# Patient Record
Sex: FEMALE | URBAN - METROPOLITAN AREA
[De-identification: names, ages, dates, MRNs, and addresses within clinical notes are randomized per-mention and may not be internally consistent; named-entity substitution may affect disease eponyms.]

---

## 2017-03-23 ENCOUNTER — INPATIENT (INPATIENT)
Facility: HOSPITAL | Age: 42
LOS: 0 days | Discharge: HOME | DRG: 390 | End: 2017-03-24
Attending: SURGERY | Admitting: SURGERY
Payer: MEDICAID

## 2017-03-23 VITALS
TEMPERATURE: 97 F | HEART RATE: 94 BPM | RESPIRATION RATE: 20 BRPM | HEIGHT: 58 IN | SYSTOLIC BLOOD PRESSURE: 109 MMHG | DIASTOLIC BLOOD PRESSURE: 74 MMHG | WEIGHT: 111.99 LBS | OXYGEN SATURATION: 100 %

## 2017-03-23 LAB
ALBUMIN SERPL ELPH-MCNC: 4.3 G/DL — SIGNIFICANT CHANGE UP (ref 3.3–5.2)
ALP SERPL-CCNC: 197 U/L — HIGH (ref 40–120)
ALT FLD-CCNC: 61 U/L — HIGH
ANION GAP SERPL CALC-SCNC: 11 MMOL/L — SIGNIFICANT CHANGE UP (ref 5–17)
APPEARANCE UR: CLEAR — SIGNIFICANT CHANGE UP
AST SERPL-CCNC: 41 U/L — HIGH
BACTERIA # UR AUTO: ABNORMAL
BASOPHILS # BLD AUTO: 0 K/UL — SIGNIFICANT CHANGE UP (ref 0–0.2)
BASOPHILS NFR BLD AUTO: 0.2 % — SIGNIFICANT CHANGE UP (ref 0–2)
BILIRUB SERPL-MCNC: 0.4 MG/DL — SIGNIFICANT CHANGE UP (ref 0.4–2)
BILIRUB UR-MCNC: NEGATIVE — SIGNIFICANT CHANGE UP
BUN SERPL-MCNC: 12 MG/DL — SIGNIFICANT CHANGE UP (ref 8–20)
CALCIUM SERPL-MCNC: 9.7 MG/DL — SIGNIFICANT CHANGE UP (ref 8.6–10.2)
CHLORIDE SERPL-SCNC: 103 MMOL/L — SIGNIFICANT CHANGE UP (ref 98–107)
CO2 SERPL-SCNC: 25 MMOL/L — SIGNIFICANT CHANGE UP (ref 22–29)
COD CRY URNS QL: ABNORMAL
COLOR SPEC: YELLOW — SIGNIFICANT CHANGE UP
COMMENT - URINE: SIGNIFICANT CHANGE UP
CREAT SERPL-MCNC: <0.46 MG/DL — LOW (ref 0.5–1.3)
DIFF PNL FLD: ABNORMAL
EOSINOPHIL # BLD AUTO: 0 K/UL — SIGNIFICANT CHANGE UP (ref 0–0.5)
EOSINOPHIL NFR BLD AUTO: 0.8 % — SIGNIFICANT CHANGE UP (ref 0–6)
EPI CELLS # UR: ABNORMAL
GLUCOSE SERPL-MCNC: 170 MG/DL — HIGH (ref 70–115)
GLUCOSE UR QL: NEGATIVE MG/DL — SIGNIFICANT CHANGE UP
HCG SERPL-ACNC: <2 MIU/ML — SIGNIFICANT CHANGE UP
HCG UR QL: NEGATIVE — SIGNIFICANT CHANGE UP
HCT VFR BLD CALC: 37.7 % — SIGNIFICANT CHANGE UP (ref 37–47)
HGB BLD-MCNC: 12 G/DL — SIGNIFICANT CHANGE UP (ref 12–16)
KETONES UR-MCNC: NEGATIVE — SIGNIFICANT CHANGE UP
LEUKOCYTE ESTERASE UR-ACNC: NEGATIVE — SIGNIFICANT CHANGE UP
LIDOCAIN IGE QN: 25 U/L — SIGNIFICANT CHANGE UP (ref 22–51)
LYMPHOCYTES # BLD AUTO: 1.2 K/UL — SIGNIFICANT CHANGE UP (ref 1–4.8)
LYMPHOCYTES # BLD AUTO: 18.9 % — LOW (ref 20–55)
MCHC RBC-ENTMCNC: 26.2 PG — LOW (ref 27–31)
MCHC RBC-ENTMCNC: 31.8 G/DL — LOW (ref 32–36)
MCV RBC AUTO: 82.3 FL — SIGNIFICANT CHANGE UP (ref 81–99)
MONOCYTES # BLD AUTO: 0.2 K/UL — SIGNIFICANT CHANGE UP (ref 0–0.8)
MONOCYTES NFR BLD AUTO: 3.7 % — SIGNIFICANT CHANGE UP (ref 3–10)
NEUTROPHILS # BLD AUTO: 5 K/UL — SIGNIFICANT CHANGE UP (ref 1.8–8)
NEUTROPHILS NFR BLD AUTO: 76.2 % — HIGH (ref 37–73)
NITRITE UR-MCNC: NEGATIVE — SIGNIFICANT CHANGE UP
PH UR: 5 — SIGNIFICANT CHANGE UP (ref 4.8–8)
PLATELET # BLD AUTO: 200 K/UL — SIGNIFICANT CHANGE UP (ref 150–400)
POTASSIUM SERPL-MCNC: 3.8 MMOL/L — SIGNIFICANT CHANGE UP (ref 3.5–5.3)
POTASSIUM SERPL-SCNC: 3.8 MMOL/L — SIGNIFICANT CHANGE UP (ref 3.5–5.3)
PROT SERPL-MCNC: 7.5 G/DL — SIGNIFICANT CHANGE UP (ref 6.6–8.7)
PROT UR-MCNC: NEGATIVE MG/DL — SIGNIFICANT CHANGE UP
RBC # BLD: 4.58 M/UL — SIGNIFICANT CHANGE UP (ref 4.4–5.2)
RBC # FLD: 15.2 % — SIGNIFICANT CHANGE UP (ref 11–15.6)
RBC CASTS # UR COMP ASSIST: ABNORMAL /HPF (ref 0–4)
SODIUM SERPL-SCNC: 139 MMOL/L — SIGNIFICANT CHANGE UP (ref 135–145)
SP GR SPEC: 1.02 — SIGNIFICANT CHANGE UP (ref 1.01–1.02)
UROBILINOGEN FLD QL: NEGATIVE MG/DL — SIGNIFICANT CHANGE UP
WBC # BLD: 6.6 K/UL — SIGNIFICANT CHANGE UP (ref 4.8–10.8)
WBC # FLD AUTO: 6.6 K/UL — SIGNIFICANT CHANGE UP (ref 4.8–10.8)
WBC UR QL: SIGNIFICANT CHANGE UP

## 2017-03-23 PROCEDURE — 99285 EMERGENCY DEPT VISIT HI MDM: CPT

## 2017-03-23 RX ORDER — SODIUM CHLORIDE 9 MG/ML
1000 INJECTION INTRAMUSCULAR; INTRAVENOUS; SUBCUTANEOUS
Qty: 0 | Refills: 0 | Status: DISCONTINUED | OUTPATIENT
Start: 2017-03-23 | End: 2017-03-24

## 2017-03-23 RX ORDER — PANTOPRAZOLE SODIUM 20 MG/1
40 TABLET, DELAYED RELEASE ORAL ONCE
Qty: 0 | Refills: 0 | Status: COMPLETED | OUTPATIENT
Start: 2017-03-23 | End: 2017-03-23

## 2017-03-23 RX ORDER — SODIUM CHLORIDE 9 MG/ML
1000 INJECTION INTRAMUSCULAR; INTRAVENOUS; SUBCUTANEOUS ONCE
Qty: 0 | Refills: 0 | Status: COMPLETED | OUTPATIENT
Start: 2017-03-23 | End: 2017-03-23

## 2017-03-23 RX ORDER — METOCLOPRAMIDE HCL 10 MG
10 TABLET ORAL ONCE
Qty: 0 | Refills: 0 | Status: COMPLETED | OUTPATIENT
Start: 2017-03-23 | End: 2017-03-23

## 2017-03-23 RX ORDER — KETOROLAC TROMETHAMINE 30 MG/ML
30 SYRINGE (ML) INJECTION ONCE
Qty: 0 | Refills: 0 | Status: DISCONTINUED | OUTPATIENT
Start: 2017-03-23 | End: 2017-03-23

## 2017-03-23 RX ADMIN — SODIUM CHLORIDE 1000 MILLILITER(S): 9 INJECTION INTRAMUSCULAR; INTRAVENOUS; SUBCUTANEOUS at 23:05

## 2017-03-23 RX ADMIN — PANTOPRAZOLE SODIUM 40 MILLIGRAM(S): 20 TABLET, DELAYED RELEASE ORAL at 23:05

## 2017-03-23 RX ADMIN — Medication 10 MILLIGRAM(S): at 23:05

## 2017-03-23 NOTE — ED STATDOCS - MEDICAL DECISION MAKING DETAILS
Sent by PMD for abd pain. Perimumbiclia/distention w/ n/v. H/o gastric bypass. Concern for obstruction. Line, labs, lipase for pancreatitis. Urine for UA/preg. Check results and re-assess.

## 2017-03-23 NOTE — ED STATDOCS - GASTROINTESTINAL, MLM
Abdomen distended and diffusely tender to deep palpation. No rebound or guarding. Bowel sounds present.

## 2017-03-23 NOTE — ED STATDOCS - NS ED MD SCRIBE ATTENDING SCRIBE SECTIONS
PHYSICAL EXAM/DISPOSITION/VITAL SIGNS( Pullset)/REVIEW OF SYSTEMS/HISTORY OF PRESENT ILLNESS/PAST MEDICAL/SURGICAL/SOCIAL HISTORY/HIV

## 2017-03-23 NOTE — ED ADULT NURSE NOTE - OBJECTIVE STATEMENT
pt received in super[facility]. pt is awake alert oriented following commands and speaking coherently. pt presents to er complaining of umbilical abdominal pain since last night. acute onset. abdomen soft and tender to touch. resp even and unlabored. lung sounds cta bl. complaining of associated nausea and vomiting. denies fever chills chest pain sob headache urinary problems diarrhea constipation. ambulatory with steady gait

## 2017-03-23 NOTE — ED STATDOCS - OBJECTIVE STATEMENT
42 y/o female c/o umbilical abd pain x 1 day. Acute onset. Associated nausea and 3 episodes vomiting today. Pain is exacerbated w/ eating. Denies diarrhea, dysuria. Denies h/o similar pain. PSHx gastric bypass 07/2015 and "?abdominal mass" removed. Pt saw Dr. Forte PCP - recommended go to [place] ED due to hernia. No further complaints at this time.

## 2017-03-23 NOTE — ED STATDOCS - LAB INTERPRETATION
no white count but left shift  urine = rbcs with crystals and bacteria  elevated alt/ast and alk phos  elevated bs

## 2017-03-23 NOTE — ED STATDOCS - PROGRESS NOTE DETAILS
Pt CT done and Radiologist called and stated that Pt has + Bowel obstruction. Pt made NPO and was treated with 100mg IV Acetaminophen ans Pt feels al ot better.

## 2017-03-23 NOTE — ED STATDOCS - ATTENDING CONTRIBUTION TO CARE
I, Jonathan Cardona, performed the initial face to face bedside interview with this patient regarding history of present illness, review of symptoms and relevant past medical, social and family history.  I completed an independent physical examination.  I was the initial provider who evaluated this patient. I have signed out the follow up of any pending tests (i.e. labs, radiological studies) to the ACP.  I have communicated the patient’s plan of care and disposition with the ACP.  The history, relevant review of systems, past medical and surgical history, medical decision making, and physical examination was documented by the scribe in my presence and I attest to the accuracy of the documentation.

## 2017-03-24 VITALS
HEART RATE: 61 BPM | TEMPERATURE: 98 F | SYSTOLIC BLOOD PRESSURE: 101 MMHG | OXYGEN SATURATION: 100 % | RESPIRATION RATE: 18 BRPM | DIASTOLIC BLOOD PRESSURE: 66 MMHG

## 2017-03-24 DIAGNOSIS — Z98.890 OTHER SPECIFIED POSTPROCEDURAL STATES: Chronic | ICD-10-CM

## 2017-03-24 DIAGNOSIS — K56.60 UNSPECIFIED INTESTINAL OBSTRUCTION: ICD-10-CM

## 2017-03-24 DIAGNOSIS — E66.9 OBESITY, UNSPECIFIED: ICD-10-CM

## 2017-03-24 DIAGNOSIS — K56.5 INTESTINAL ADHESIONS [BANDS] WITH OBSTRUCTION (POSTINFECTION): ICD-10-CM

## 2017-03-24 PROCEDURE — 71010: CPT | Mod: 26

## 2017-03-24 PROCEDURE — 74177 CT ABD & PELVIS W/CONTRAST: CPT | Mod: 26

## 2017-03-24 RX ORDER — ACETAMINOPHEN 500 MG
1000 TABLET ORAL ONCE
Qty: 0 | Refills: 0 | Status: COMPLETED | OUTPATIENT
Start: 2017-03-24 | End: 2017-03-24

## 2017-03-24 RX ORDER — SODIUM CHLORIDE 9 MG/ML
1000 INJECTION, SOLUTION INTRAVENOUS
Qty: 0 | Refills: 0 | Status: DISCONTINUED | OUTPATIENT
Start: 2017-03-24 | End: 2017-03-24

## 2017-03-24 RX ORDER — ENOXAPARIN SODIUM 100 MG/ML
40 INJECTION SUBCUTANEOUS EVERY 24 HOURS
Qty: 0 | Refills: 0 | Status: DISCONTINUED | OUTPATIENT
Start: 2017-03-24 | End: 2017-03-24

## 2017-03-24 RX ADMIN — SODIUM CHLORIDE 150 MILLILITER(S): 9 INJECTION INTRAMUSCULAR; INTRAVENOUS; SUBCUTANEOUS at 09:36

## 2017-03-24 RX ADMIN — Medication 1000 MILLIGRAM(S): at 13:12

## 2017-03-24 RX ADMIN — Medication 400 MILLIGRAM(S): at 10:44

## 2017-03-24 RX ADMIN — SODIUM CHLORIDE 100 MILLILITER(S): 9 INJECTION, SOLUTION INTRAVENOUS at 06:04

## 2017-03-24 NOTE — DISCHARGE NOTE ADULT - HOSPITAL COURSE
A 42 yo F presents to the ED c/o abdominal pain x 3 days. Pain constant, localized in mid abdomen, associated with chills, nausea and vomiting. Last bowel movement was yesterday, no flatus. Has never experienced similar pain in the past. Denies CP, SOB or diaphoresis. No change in diet or sick contacts.   CT A/P revealed obstruction at darrel limb, trace amount enteric contrast seen distally beyong the jejunostomy.  Pt did not require NGT on this admission.  Pt diet advanced hospital day 1, tolerated.  Abd soft, no further nausea or vomting.  Pt stable for d/c home with recommended outpatient follow up with bariatric surgeon.

## 2017-03-24 NOTE — DISCHARGE NOTE ADULT - CARE PLAN
Principal Discharge DX:	Partial bowel obstruction  Goal:	resolution of obstruction  Instructions for follow-up, activity and diet:	Pt may resume regular diet as tolerated, showering permitted.  Recommended to follow up with Bariatric Surgeon as an outpatient.  Return to the ED immediately for inability to have BM, severe abd pain, nausea, vomiting, high fever.

## 2017-03-24 NOTE — PROGRESS NOTE ADULT - SUBJECTIVE AND OBJECTIVE BOX
Feels ok no pain today  afebrile  Abd benign   Will try feeds   Irf fails will need will need revision if ok can be dced andf advised to follow up with bariatric surgeon

## 2017-03-24 NOTE — DISCHARGE NOTE ADULT - PLAN OF CARE
resolution of obstruction Pt may resume regular diet as tolerated, showering permitted.  Recommended to follow up with Bariatric Surgeon as an outpatient.  Return to the ED immediately for inability to have BM, severe abd pain, nausea, vomiting, high fever.

## 2017-03-24 NOTE — H&P ADULT - HISTORY OF PRESENT ILLNESS
A 42 yo F presents to the ED c/o abdominal pain x 3 days. Pain constant, localized in mid abdomen, associated with chills, nausea and vomiting. Last bowel movement was yesterday, no flatus. Has never experienced similar pain in the past. Denies CP, SOB or diaphoresis. No change in diet or sick contacts.

## 2017-03-24 NOTE — H&P ADULT - ASSESSMENT
A 40 yo F presents w abdominal pain. Midline abdominal pain is constant a/w chills, nausea, and vomiting. On PE she's afebrile, normotensive. Distended abdomen w umbilical port site hernia which is reducible and tender. No elevated wbc count, does have a shift. CT A/P confirms diagnosis of SBO of [place] limb.  / NPO, IV fluids  / NGT placement was discussed patient refusing although highly recommended for decompression    / serial abdominal checks   / strict I/OS  / analgesics as needed   / VTE ppx   / replete lytes prn   / d/w Dr. Melvin

## 2017-03-24 NOTE — DISCHARGE NOTE ADULT - CARE PROVIDER_API CALL
Rogers Melvin (MD), Surgery  [address]  [place] [address]  Phone: [phone]  Fax: [phone]

## 2017-03-24 NOTE — DISCHARGE NOTE ADULT - PATIENT PORTAL LINK FT
“You can access the [facility] Patient Portal, offered by [facility], by registering with the following website: [email or web address]”

## 2017-12-15 PROCEDURE — 74177 CT ABD & PELVIS W/CONTRAST: CPT

## 2017-12-15 PROCEDURE — 84702 CHORIONIC GONADOTROPIN TEST: CPT

## 2017-12-15 PROCEDURE — 80053 COMPREHEN METABOLIC PANEL: CPT

## 2017-12-15 PROCEDURE — 99285 EMERGENCY DEPT VISIT HI MDM: CPT | Mod: 25

## 2017-12-15 PROCEDURE — 96374 THER/PROPH/DIAG INJ IV PUSH: CPT | Mod: XU

## 2017-12-15 PROCEDURE — 81025 URINE PREGNANCY TEST: CPT

## 2017-12-15 PROCEDURE — 85027 COMPLETE CBC AUTOMATED: CPT

## 2017-12-15 PROCEDURE — 71045 X-RAY EXAM CHEST 1 VIEW: CPT

## 2017-12-15 PROCEDURE — 83690 ASSAY OF LIPASE: CPT

## 2017-12-15 PROCEDURE — 96375 TX/PRO/DX INJ NEW DRUG ADDON: CPT | Mod: XU

## 2017-12-15 PROCEDURE — 81001 URINALYSIS AUTO W/SCOPE: CPT

## 2019-08-20 ENCOUNTER — EMERGENCY (EMERGENCY)
Facility: HOSPITAL | Age: 44
LOS: 1 days | Discharge: HOME | End: 2019-08-20
Attending: EMERGENCY MEDICINE
Payer: MEDICAID

## 2019-08-20 VITALS
HEART RATE: 60 BPM | DIASTOLIC BLOOD PRESSURE: 86 MMHG | OXYGEN SATURATION: 98 % | SYSTOLIC BLOOD PRESSURE: 116 MMHG | TEMPERATURE: 98 F | RESPIRATION RATE: 17 BRPM

## 2019-08-20 VITALS
HEART RATE: 82 BPM | DIASTOLIC BLOOD PRESSURE: 72 MMHG | SYSTOLIC BLOOD PRESSURE: 119 MMHG | HEIGHT: 58 IN | RESPIRATION RATE: 18 BRPM | OXYGEN SATURATION: 100 % | WEIGHT: 123.02 LBS | TEMPERATURE: 99 F

## 2019-08-20 DIAGNOSIS — Z98.890 OTHER SPECIFIED POSTPROCEDURAL STATES: Chronic | ICD-10-CM

## 2019-08-20 LAB
ALBUMIN SERPL ELPH-MCNC: 4.1 G/DL — SIGNIFICANT CHANGE UP (ref 3.3–5.2)
ALP SERPL-CCNC: 135 U/L — HIGH (ref 40–120)
ALT FLD-CCNC: 32 U/L — SIGNIFICANT CHANGE UP
ANION GAP SERPL CALC-SCNC: 9 MMOL/L — SIGNIFICANT CHANGE UP (ref 5–17)
APPEARANCE UR: CLEAR — SIGNIFICANT CHANGE UP
AST SERPL-CCNC: 39 U/L — HIGH
BASOPHILS # BLD AUTO: 0.03 K/UL — SIGNIFICANT CHANGE UP (ref 0–0.2)
BASOPHILS NFR BLD AUTO: 0.6 % — SIGNIFICANT CHANGE UP (ref 0–2)
BILIRUB SERPL-MCNC: 0.4 MG/DL — SIGNIFICANT CHANGE UP (ref 0.4–2)
BILIRUB UR-MCNC: NEGATIVE — SIGNIFICANT CHANGE UP
BUN SERPL-MCNC: 9 MG/DL — SIGNIFICANT CHANGE UP (ref 8–20)
CALCIUM SERPL-MCNC: 9.7 MG/DL — SIGNIFICANT CHANGE UP (ref 8.6–10.2)
CHLORIDE SERPL-SCNC: 106 MMOL/L — SIGNIFICANT CHANGE UP (ref 98–107)
CO2 SERPL-SCNC: 26 MMOL/L — SIGNIFICANT CHANGE UP (ref 22–29)
COLOR SPEC: YELLOW — SIGNIFICANT CHANGE UP
CREAT SERPL-MCNC: 0.31 MG/DL — LOW (ref 0.5–1.3)
DIFF PNL FLD: NEGATIVE — SIGNIFICANT CHANGE UP
EOSINOPHIL # BLD AUTO: 0.07 K/UL — SIGNIFICANT CHANGE UP (ref 0–0.5)
EOSINOPHIL NFR BLD AUTO: 1.4 % — SIGNIFICANT CHANGE UP (ref 0–6)
GLUCOSE SERPL-MCNC: 122 MG/DL — HIGH (ref 70–115)
GLUCOSE UR QL: NEGATIVE MG/DL — SIGNIFICANT CHANGE UP
HCG UR QL: NEGATIVE — SIGNIFICANT CHANGE UP
HCT VFR BLD CALC: 40.1 % — SIGNIFICANT CHANGE UP (ref 34.5–45)
HGB BLD-MCNC: 12.6 G/DL — SIGNIFICANT CHANGE UP (ref 11.5–15.5)
IMM GRANULOCYTES NFR BLD AUTO: 0.2 % — SIGNIFICANT CHANGE UP (ref 0–1.5)
KETONES UR-MCNC: NEGATIVE — SIGNIFICANT CHANGE UP
LEUKOCYTE ESTERASE UR-ACNC: NEGATIVE — SIGNIFICANT CHANGE UP
LIDOCAIN IGE QN: 21 U/L — LOW (ref 22–51)
LYMPHOCYTES # BLD AUTO: 1.85 K/UL — SIGNIFICANT CHANGE UP (ref 1–3.3)
LYMPHOCYTES # BLD AUTO: 36.2 % — SIGNIFICANT CHANGE UP (ref 13–44)
MCHC RBC-ENTMCNC: 29.2 PG — SIGNIFICANT CHANGE UP (ref 27–34)
MCHC RBC-ENTMCNC: 31.4 GM/DL — LOW (ref 32–36)
MCV RBC AUTO: 92.8 FL — SIGNIFICANT CHANGE UP (ref 80–100)
MONOCYTES # BLD AUTO: 0.3 K/UL — SIGNIFICANT CHANGE UP (ref 0–0.9)
MONOCYTES NFR BLD AUTO: 5.9 % — SIGNIFICANT CHANGE UP (ref 2–14)
NEUTROPHILS # BLD AUTO: 2.85 K/UL — SIGNIFICANT CHANGE UP (ref 1.8–7.4)
NEUTROPHILS NFR BLD AUTO: 55.7 % — SIGNIFICANT CHANGE UP (ref 43–77)
NITRITE UR-MCNC: NEGATIVE — SIGNIFICANT CHANGE UP
PH UR: 6 — SIGNIFICANT CHANGE UP (ref 5–8)
PLATELET # BLD AUTO: 160 K/UL — SIGNIFICANT CHANGE UP (ref 150–400)
POTASSIUM SERPL-MCNC: 4.9 MMOL/L — SIGNIFICANT CHANGE UP (ref 3.5–5.3)
POTASSIUM SERPL-SCNC: 4.9 MMOL/L — SIGNIFICANT CHANGE UP (ref 3.5–5.3)
PROT SERPL-MCNC: 7 G/DL — SIGNIFICANT CHANGE UP (ref 6.6–8.7)
PROT UR-MCNC: NEGATIVE MG/DL — SIGNIFICANT CHANGE UP
RBC # BLD: 4.32 M/UL — SIGNIFICANT CHANGE UP (ref 3.8–5.2)
RBC # FLD: 12.9 % — SIGNIFICANT CHANGE UP (ref 10.3–14.5)
SODIUM SERPL-SCNC: 141 MMOL/L — SIGNIFICANT CHANGE UP (ref 135–145)
SP GR SPEC: 1.01 — SIGNIFICANT CHANGE UP (ref 1.01–1.02)
UROBILINOGEN FLD QL: NEGATIVE MG/DL — SIGNIFICANT CHANGE UP
WBC # BLD: 5.11 K/UL — SIGNIFICANT CHANGE UP (ref 3.8–10.5)
WBC # FLD AUTO: 5.11 K/UL — SIGNIFICANT CHANGE UP (ref 3.8–10.5)

## 2019-08-20 PROCEDURE — 81003 URINALYSIS AUTO W/O SCOPE: CPT

## 2019-08-20 PROCEDURE — 81025 URINE PREGNANCY TEST: CPT

## 2019-08-20 PROCEDURE — 87086 URINE CULTURE/COLONY COUNT: CPT

## 2019-08-20 PROCEDURE — 85027 COMPLETE CBC AUTOMATED: CPT

## 2019-08-20 PROCEDURE — 74177 CT ABD & PELVIS W/CONTRAST: CPT | Mod: 26

## 2019-08-20 PROCEDURE — 96360 HYDRATION IV INFUSION INIT: CPT

## 2019-08-20 PROCEDURE — 74177 CT ABD & PELVIS W/CONTRAST: CPT

## 2019-08-20 PROCEDURE — 99284 EMERGENCY DEPT VISIT MOD MDM: CPT | Mod: 25

## 2019-08-20 PROCEDURE — 36415 COLL VENOUS BLD VENIPUNCTURE: CPT

## 2019-08-20 PROCEDURE — 99284 EMERGENCY DEPT VISIT MOD MDM: CPT

## 2019-08-20 PROCEDURE — 80053 COMPREHEN METABOLIC PANEL: CPT

## 2019-08-20 PROCEDURE — 83690 ASSAY OF LIPASE: CPT

## 2019-08-20 RX ORDER — SODIUM CHLORIDE 9 MG/ML
1000 INJECTION INTRAMUSCULAR; INTRAVENOUS; SUBCUTANEOUS ONCE
Refills: 0 | Status: COMPLETED | OUTPATIENT
Start: 2019-08-20 | End: 2019-08-20

## 2019-08-20 RX ADMIN — SODIUM CHLORIDE 1000 MILLILITER(S): 9 INJECTION INTRAMUSCULAR; INTRAVENOUS; SUBCUTANEOUS at 18:12

## 2019-08-20 RX ADMIN — SODIUM CHLORIDE 1000 MILLILITER(S): 9 INJECTION INTRAMUSCULAR; INTRAVENOUS; SUBCUTANEOUS at 19:18

## 2019-08-20 NOTE — ED STATDOCS - OBJECTIVE STATEMENT
Pt ui s a 42 y/o F, s/p gastric bypass three years ago, presenting to the ED with c/o abdominal pain. Pt states the pain is located in the left lower and lateral abd, with radiating to her left flank. Pain has been constant for 3 days and gradually worsening. She notes the pain is worse with movement and improved with applying some pressure. Associated nausea. Denies V/D, fever, urinary sxs. Pt states she has had this pain in the past which was attributed to a kidney infection./

## 2019-08-20 NOTE — ED STATDOCS - PHYSICAL EXAMINATION
Gen: NAD, AOx3  Head: NCAT  HEENT: EOMI, oral mucosa moist, normal conjunctiva, neck supple  Lung: no respiratory distress  CV:  Normal perfusion  Abd: soft, diffuse upper abd tenderness, non distended   MSK: No edema, no visible deformities  Neuro: No focal neurologic deficits  Skin: No rash   Psych: normal affect Gen: NAD, AOx3  Head: NCAT  HEENT: EOMI, oral mucosa moist, normal conjunctiva, neck supple  Lung: no respiratory distress  CV:  Normal perfusion  Abd: soft, diffuse upper abd tenderness, non distended, no CVA ttp  MSK: No edema, no visible deformities  Neuro: No focal neurologic deficits  Skin: No rash   Psych: normal affect

## 2019-08-20 NOTE — ED STATDOCS - NS ED ROS FT
ROS: no CP/SOB. no cough. no fever. no v/d/c. no rash. no bleeding. no urinary complaints. no weakness. no vision changes. no HA. no neck/back pain. no extremity swelling/deformity. No change in mental status.     +abd pain, nausea

## 2019-08-20 NOTE — ED STATDOCS - PROGRESS NOTE DETAILS
HOLLY Huber NOTE: Pt evaluated at bedside. Pt reporting left lower and lateral abdominal pain that started 3 days ago, gradually worsened, denies urinary sxs. Had normal BM today. No fever, vomiting or diarrhea. Abd soft with upper abdominal TTP, left flank TTP. Pt evaluated prior by intake physician. Otherwise HPI/PE/ROS as noted above. Will follow up plan per intake physician. CT negative. will d/c with outpt Follow up -Slowashok DO

## 2019-08-20 NOTE — ED ADULT NURSE NOTE - NSIMPLEMENTINTERV_GEN_ALL_ED
Implemented All Universal Safety Interventions:  [place] to call system. Call bell, personal items and telephone within reach. Instruct patient to call for assistance. Room bathroom lighting operational. Non-slip footwear when patient is off stretcher. Physically safe environment: no spills, clutter or unnecessary equipment. Stretcher in lowest position, wheels locked, appropriate side rails in place.

## 2019-08-20 NOTE — ED STATDOCS - CARE PLAN
Principal Discharge DX:	Generalized abdominal pain Principal Discharge DX:	Generalized abdominal pain  Assessment and plan of treatment:	1. Return to ED for worsening, progressive or any other concerning symptoms   2. Follow up with your primary care doctor in 2-3days   3. Take motrin 600mg every 6 hours as needed for pain and Take Tylenol up to 650 mg every 6 hours as needed for pain.

## 2019-08-20 NOTE — ED STATDOCS - ATTENDING CONTRIBUTION TO CARE
I, Kenya Nava, performed the initial face to face bedside interview with this patient regarding history of present illness, review of symptoms and relevant past medical, social and family history.  I completed an independent physical examination.   The medical decision making and follow-up on ordered tests (ie labs, radiologic studies) and re-evaluation of the patient's status has been communicated to the ACP.  Disposition of the patient will be based on test outcome and response to ED interventions.  The history, relevant review of systems, past medical and surgical history, medical decision making, and physical examination was documented by the scribe in my presence and I attest to the accuracy of the documentation.

## 2019-08-21 PROBLEM — E66.9 OBESITY, UNSPECIFIED: Chronic | Status: ACTIVE | Noted: 2017-03-24

## 2019-08-21 LAB
CULTURE RESULTS: SIGNIFICANT CHANGE UP
SPECIMEN SOURCE: SIGNIFICANT CHANGE UP

## 2020-02-10 ENCOUNTER — TRANSCRIPTION ENCOUNTER (OUTPATIENT)
Age: 45
End: 2020-02-10

## 2020-02-11 ENCOUNTER — INPATIENT (INPATIENT)
Facility: HOSPITAL | Age: 45
LOS: 2 days | Discharge: HOME | DRG: 326 | End: 2020-02-14
Attending: SURGERY | Admitting: SURGERY
Payer: MEDICAID

## 2020-02-11 VITALS
SYSTOLIC BLOOD PRESSURE: 97 MMHG | WEIGHT: 121.92 LBS | HEART RATE: 75 BPM | OXYGEN SATURATION: 99 % | RESPIRATION RATE: 18 BRPM | TEMPERATURE: 99 F | DIASTOLIC BLOOD PRESSURE: 67 MMHG

## 2020-02-11 DIAGNOSIS — K56.609 UNSPECIFIED INTESTINAL OBSTRUCTION, UNSPECIFIED AS TO PARTIAL VERSUS COMPLETE OBSTRUCTION: ICD-10-CM

## 2020-02-11 DIAGNOSIS — Z98.890 OTHER SPECIFIED POSTPROCEDURAL STATES: Chronic | ICD-10-CM

## 2020-02-11 PROBLEM — N15.9 RENAL TUBULO-INTERSTITIAL DISEASE, UNSPECIFIED: Chronic | Status: ACTIVE | Noted: 2019-08-20

## 2020-02-11 LAB
ABO RH CONFIRMATION: SIGNIFICANT CHANGE UP
ALBUMIN SERPL ELPH-MCNC: 4.6 G/DL — SIGNIFICANT CHANGE UP (ref 3.3–5.2)
ALP SERPL-CCNC: 158 U/L — HIGH (ref 40–120)
ALT FLD-CCNC: 45 U/L — HIGH
ANION GAP SERPL CALC-SCNC: 14 MMOL/L — SIGNIFICANT CHANGE UP (ref 5–17)
ANION GAP SERPL CALC-SCNC: 14 MMOL/L — SIGNIFICANT CHANGE UP (ref 5–17)
AST SERPL-CCNC: 51 U/L — HIGH
BASOPHILS # BLD AUTO: 0.04 K/UL — SIGNIFICANT CHANGE UP (ref 0–0.2)
BASOPHILS NFR BLD AUTO: 0.4 % — SIGNIFICANT CHANGE UP (ref 0–2)
BILIRUB SERPL-MCNC: 0.3 MG/DL — LOW (ref 0.4–2)
BLD GP AB SCN SERPL QL: SIGNIFICANT CHANGE UP
BUN SERPL-MCNC: 10 MG/DL — SIGNIFICANT CHANGE UP (ref 8–20)
BUN SERPL-MCNC: 17 MG/DL — SIGNIFICANT CHANGE UP (ref 8–20)
CALCIUM SERPL-MCNC: 7.6 MG/DL — LOW (ref 8.6–10.2)
CALCIUM SERPL-MCNC: 9.8 MG/DL — SIGNIFICANT CHANGE UP (ref 8.6–10.2)
CHLORIDE SERPL-SCNC: 102 MMOL/L — SIGNIFICANT CHANGE UP (ref 98–107)
CHLORIDE SERPL-SCNC: 103 MMOL/L — SIGNIFICANT CHANGE UP (ref 98–107)
CO2 SERPL-SCNC: 19 MMOL/L — LOW (ref 22–29)
CO2 SERPL-SCNC: 22 MMOL/L — SIGNIFICANT CHANGE UP (ref 22–29)
CREAT SERPL-MCNC: 0.31 MG/DL — LOW (ref 0.5–1.3)
CREAT SERPL-MCNC: 0.41 MG/DL — LOW (ref 0.5–1.3)
EOSINOPHIL # BLD AUTO: 0.02 K/UL — SIGNIFICANT CHANGE UP (ref 0–0.5)
EOSINOPHIL NFR BLD AUTO: 0.2 % — SIGNIFICANT CHANGE UP (ref 0–6)
GAS PNL BLDA: SIGNIFICANT CHANGE UP
GLUCOSE SERPL-MCNC: 160 MG/DL — HIGH (ref 70–99)
GLUCOSE SERPL-MCNC: 193 MG/DL — HIGH (ref 70–99)
HCG SERPL-ACNC: <4 MIU/ML — SIGNIFICANT CHANGE UP
HCT VFR BLD CALC: 38.5 % — SIGNIFICANT CHANGE UP (ref 34.5–45)
HCT VFR BLD CALC: 47.1 % — HIGH (ref 34.5–45)
HGB BLD-MCNC: 12.8 G/DL — SIGNIFICANT CHANGE UP (ref 11.5–15.5)
HGB BLD-MCNC: 15.5 G/DL — SIGNIFICANT CHANGE UP (ref 11.5–15.5)
IMM GRANULOCYTES NFR BLD AUTO: 0.4 % — SIGNIFICANT CHANGE UP (ref 0–1.5)
LACTATE BLDV-MCNC: 1.7 MMOL/L — SIGNIFICANT CHANGE UP (ref 0.5–2)
LIDOCAIN IGE QN: 40 U/L — SIGNIFICANT CHANGE UP (ref 22–51)
LYMPHOCYTES # BLD AUTO: 1.01 K/UL — SIGNIFICANT CHANGE UP (ref 1–3.3)
LYMPHOCYTES # BLD AUTO: 8.9 % — LOW (ref 13–44)
MAGNESIUM SERPL-MCNC: 1.6 MG/DL — SIGNIFICANT CHANGE UP (ref 1.6–2.6)
MCHC RBC-ENTMCNC: 30 PG — SIGNIFICANT CHANGE UP (ref 27–34)
MCHC RBC-ENTMCNC: 30.3 PG — SIGNIFICANT CHANGE UP (ref 27–34)
MCHC RBC-ENTMCNC: 32.9 GM/DL — SIGNIFICANT CHANGE UP (ref 32–36)
MCHC RBC-ENTMCNC: 33.2 GM/DL — SIGNIFICANT CHANGE UP (ref 32–36)
MCV RBC AUTO: 91 FL — SIGNIFICANT CHANGE UP (ref 80–100)
MCV RBC AUTO: 91.1 FL — SIGNIFICANT CHANGE UP (ref 80–100)
MONOCYTES # BLD AUTO: 0.52 K/UL — SIGNIFICANT CHANGE UP (ref 0–0.9)
MONOCYTES NFR BLD AUTO: 4.6 % — SIGNIFICANT CHANGE UP (ref 2–14)
NEUTROPHILS # BLD AUTO: 9.68 K/UL — HIGH (ref 1.8–7.4)
NEUTROPHILS NFR BLD AUTO: 85.5 % — HIGH (ref 43–77)
PHOSPHATE SERPL-MCNC: 2.3 MG/DL — LOW (ref 2.4–4.7)
PLATELET # BLD AUTO: 123 K/UL — LOW (ref 150–400)
PLATELET # BLD AUTO: 180 K/UL — SIGNIFICANT CHANGE UP (ref 150–400)
POTASSIUM SERPL-MCNC: 3.9 MMOL/L — SIGNIFICANT CHANGE UP (ref 3.5–5.3)
POTASSIUM SERPL-MCNC: 5.2 MMOL/L — SIGNIFICANT CHANGE UP (ref 3.5–5.3)
POTASSIUM SERPL-SCNC: 3.9 MMOL/L — SIGNIFICANT CHANGE UP (ref 3.5–5.3)
POTASSIUM SERPL-SCNC: 5.2 MMOL/L — SIGNIFICANT CHANGE UP (ref 3.5–5.3)
PROT SERPL-MCNC: 7.8 G/DL — SIGNIFICANT CHANGE UP (ref 6.6–8.7)
RBC # BLD: 4.23 M/UL — SIGNIFICANT CHANGE UP (ref 3.8–5.2)
RBC # BLD: 5.17 M/UL — SIGNIFICANT CHANGE UP (ref 3.8–5.2)
RBC # FLD: 13.1 % — SIGNIFICANT CHANGE UP (ref 10.3–14.5)
RBC # FLD: 13.4 % — SIGNIFICANT CHANGE UP (ref 10.3–14.5)
SODIUM SERPL-SCNC: 136 MMOL/L — SIGNIFICANT CHANGE UP (ref 135–145)
SODIUM SERPL-SCNC: 138 MMOL/L — SIGNIFICANT CHANGE UP (ref 135–145)
WBC # BLD: 11.31 K/UL — HIGH (ref 3.8–10.5)
WBC # BLD: 2.93 K/UL — LOW (ref 3.8–10.5)
WBC # FLD AUTO: 11.31 K/UL — HIGH (ref 3.8–10.5)
WBC # FLD AUTO: 2.93 K/UL — LOW (ref 3.8–10.5)

## 2020-02-11 PROCEDURE — 44180 LAP ENTEROLYSIS: CPT | Mod: 80

## 2020-02-11 PROCEDURE — 99285 EMERGENCY DEPT VISIT HI MDM: CPT

## 2020-02-11 PROCEDURE — 44180 LAP ENTEROLYSIS: CPT

## 2020-02-11 PROCEDURE — 74019 RADEX ABDOMEN 2 VIEWS: CPT | Mod: 26

## 2020-02-11 PROCEDURE — 99291 CRITICAL CARE FIRST HOUR: CPT

## 2020-02-11 PROCEDURE — 74177 CT ABD & PELVIS W/CONTRAST: CPT | Mod: 26

## 2020-02-11 PROCEDURE — 93010 ELECTROCARDIOGRAM REPORT: CPT

## 2020-02-11 PROCEDURE — 99222 1ST HOSP IP/OBS MODERATE 55: CPT | Mod: 57

## 2020-02-11 RX ORDER — SODIUM CHLORIDE 9 MG/ML
1000 INJECTION, SOLUTION INTRAVENOUS
Refills: 0 | Status: DISCONTINUED | OUTPATIENT
Start: 2020-02-11 | End: 2020-02-11

## 2020-02-11 RX ORDER — PROPOFOL 10 MG/ML
20 INJECTION, EMULSION INTRAVENOUS
Qty: 1000 | Refills: 0 | Status: DISCONTINUED | OUTPATIENT
Start: 2020-02-11 | End: 2020-02-12

## 2020-02-11 RX ORDER — FENTANYL CITRATE 50 UG/ML
0.5 INJECTION INTRAVENOUS
Qty: 2500 | Refills: 0 | Status: DISCONTINUED | OUTPATIENT
Start: 2020-02-11 | End: 2020-02-12

## 2020-02-11 RX ORDER — ENOXAPARIN SODIUM 100 MG/ML
40 INJECTION SUBCUTANEOUS ONCE
Refills: 0 | Status: DISCONTINUED | OUTPATIENT
Start: 2020-02-11 | End: 2020-02-11

## 2020-02-11 RX ORDER — ONDANSETRON 8 MG/1
4 TABLET, FILM COATED ORAL ONCE
Refills: 0 | Status: COMPLETED | OUTPATIENT
Start: 2020-02-11 | End: 2020-02-11

## 2020-02-11 RX ORDER — ACETAMINOPHEN 500 MG
650 TABLET ORAL EVERY 6 HOURS
Refills: 0 | Status: DISCONTINUED | OUTPATIENT
Start: 2020-02-11 | End: 2020-02-12

## 2020-02-11 RX ORDER — CEFAZOLIN SODIUM 1 G
1000 VIAL (EA) INJECTION ONCE
Refills: 0 | Status: DISCONTINUED | OUTPATIENT
Start: 2020-02-11 | End: 2020-02-11

## 2020-02-11 RX ORDER — HYDROMORPHONE HYDROCHLORIDE 2 MG/ML
0.5 INJECTION INTRAMUSCULAR; INTRAVENOUS; SUBCUTANEOUS ONCE
Refills: 0 | Status: DISCONTINUED | OUTPATIENT
Start: 2020-02-11 | End: 2020-02-11

## 2020-02-11 RX ORDER — HYDROMORPHONE HYDROCHLORIDE 2 MG/ML
2 INJECTION INTRAMUSCULAR; INTRAVENOUS; SUBCUTANEOUS ONCE
Refills: 0 | Status: DISCONTINUED | OUTPATIENT
Start: 2020-02-11 | End: 2020-02-11

## 2020-02-11 RX ORDER — CEFOTETAN DISODIUM 1 G
2 VIAL (EA) INJECTION EVERY 12 HOURS
Refills: 0 | Status: DISCONTINUED | OUTPATIENT
Start: 2020-02-11 | End: 2020-02-12

## 2020-02-11 RX ORDER — HYDROMORPHONE HYDROCHLORIDE 2 MG/ML
0.5 INJECTION INTRAMUSCULAR; INTRAVENOUS; SUBCUTANEOUS EVERY 4 HOURS
Refills: 0 | Status: DISCONTINUED | OUTPATIENT
Start: 2020-02-11 | End: 2020-02-11

## 2020-02-11 RX ORDER — CHLORHEXIDINE GLUCONATE 213 G/1000ML
15 SOLUTION TOPICAL EVERY 12 HOURS
Refills: 0 | Status: DISCONTINUED | OUTPATIENT
Start: 2020-02-11 | End: 2020-02-12

## 2020-02-11 RX ORDER — SODIUM CHLORIDE 9 MG/ML
1000 INJECTION INTRAMUSCULAR; INTRAVENOUS; SUBCUTANEOUS ONCE
Refills: 0 | Status: COMPLETED | OUTPATIENT
Start: 2020-02-11 | End: 2020-02-11

## 2020-02-11 RX ORDER — KETOROLAC TROMETHAMINE 30 MG/ML
15 SYRINGE (ML) INJECTION ONCE
Refills: 0 | Status: DISCONTINUED | OUTPATIENT
Start: 2020-02-11 | End: 2020-02-11

## 2020-02-11 RX ORDER — ENOXAPARIN SODIUM 100 MG/ML
40 INJECTION SUBCUTANEOUS DAILY
Refills: 0 | Status: DISCONTINUED | OUTPATIENT
Start: 2020-02-11 | End: 2020-02-14

## 2020-02-11 RX ORDER — CHLORHEXIDINE GLUCONATE 213 G/1000ML
1 SOLUTION TOPICAL
Refills: 0 | Status: DISCONTINUED | OUTPATIENT
Start: 2020-02-11 | End: 2020-02-14

## 2020-02-11 RX ORDER — SODIUM CHLORIDE 9 MG/ML
2000 INJECTION, SOLUTION INTRAVENOUS ONCE
Refills: 0 | Status: COMPLETED | OUTPATIENT
Start: 2020-02-11 | End: 2020-02-11

## 2020-02-11 RX ORDER — HEPARIN SODIUM 5000 [USP'U]/ML
5000 INJECTION INTRAVENOUS; SUBCUTANEOUS EVERY 8 HOURS
Refills: 0 | Status: DISCONTINUED | OUTPATIENT
Start: 2020-02-11 | End: 2020-02-11

## 2020-02-11 RX ORDER — SODIUM CHLORIDE 9 MG/ML
1000 INJECTION, SOLUTION INTRAVENOUS
Refills: 0 | Status: DISCONTINUED | OUTPATIENT
Start: 2020-02-11 | End: 2020-02-13

## 2020-02-11 RX ORDER — SODIUM CHLORIDE 9 MG/ML
2000 INJECTION INTRAMUSCULAR; INTRAVENOUS; SUBCUTANEOUS ONCE
Refills: 0 | Status: DISCONTINUED | OUTPATIENT
Start: 2020-02-11 | End: 2020-02-11

## 2020-02-11 RX ORDER — SODIUM CHLORIDE 9 MG/ML
500 INJECTION, SOLUTION INTRAVENOUS ONCE
Refills: 0 | Status: COMPLETED | OUTPATIENT
Start: 2020-02-11 | End: 2020-02-11

## 2020-02-11 RX ADMIN — SODIUM CHLORIDE 1000 MILLILITER(S): 9 INJECTION INTRAMUSCULAR; INTRAVENOUS; SUBCUTANEOUS at 05:13

## 2020-02-11 RX ADMIN — Medication 15 MILLIGRAM(S): at 06:01

## 2020-02-11 RX ADMIN — ONDANSETRON 4 MILLIGRAM(S): 8 TABLET, FILM COATED ORAL at 05:13

## 2020-02-11 RX ADMIN — Medication 15 MILLIGRAM(S): at 07:01

## 2020-02-11 RX ADMIN — ONDANSETRON 4 MILLIGRAM(S): 8 TABLET, FILM COATED ORAL at 11:10

## 2020-02-11 RX ADMIN — SODIUM CHLORIDE 2000 MILLILITER(S): 9 INJECTION, SOLUTION INTRAVENOUS at 10:15

## 2020-02-11 RX ADMIN — HYDROMORPHONE HYDROCHLORIDE 0.5 MILLIGRAM(S): 2 INJECTION INTRAMUSCULAR; INTRAVENOUS; SUBCUTANEOUS at 05:13

## 2020-02-11 RX ADMIN — HYDROMORPHONE HYDROCHLORIDE 0.5 MILLIGRAM(S): 2 INJECTION INTRAMUSCULAR; INTRAVENOUS; SUBCUTANEOUS at 10:26

## 2020-02-11 RX ADMIN — Medication 100 GRAM(S): at 18:33

## 2020-02-11 RX ADMIN — SODIUM CHLORIDE 125 MILLILITER(S): 9 INJECTION, SOLUTION INTRAVENOUS at 23:14

## 2020-02-11 RX ADMIN — CHLORHEXIDINE GLUCONATE 1 APPLICATION(S): 213 SOLUTION TOPICAL at 18:36

## 2020-02-11 RX ADMIN — HYDROMORPHONE HYDROCHLORIDE 0.5 MILLIGRAM(S): 2 INJECTION INTRAMUSCULAR; INTRAVENOUS; SUBCUTANEOUS at 07:00

## 2020-02-11 RX ADMIN — ENOXAPARIN SODIUM 40 MILLIGRAM(S): 100 INJECTION SUBCUTANEOUS at 18:35

## 2020-02-11 RX ADMIN — CHLORHEXIDINE GLUCONATE 15 MILLILITER(S): 213 SOLUTION TOPICAL at 18:35

## 2020-02-11 RX ADMIN — SODIUM CHLORIDE 1000 MILLILITER(S): 9 INJECTION INTRAMUSCULAR; INTRAVENOUS; SUBCUTANEOUS at 07:00

## 2020-02-11 NOTE — ED ADULT NURSE REASSESSMENT NOTE - NS ED NURSE REASSESS COMMENT FT1
Assumed pt care at this time, pt A & XO4, waiting for CT results. Pt drinking CT oral contrast, tolerating well.

## 2020-02-11 NOTE — H&P ADULT - NSHPPHYSICALEXAM_GEN_ALL_CORE
Constitutional: Patient resting comfortably in bed in no acute distress   HEENT: EOMI/PERRL b/l  Neck: No JVD, full ROM w/o pain  Respiratory: CTAB with unlabored respirations, no accessory muscle use or conversational dyspnea   Cardiovascular: Regular rate and rhythm with no arrythmias or murmurs  Gastrointestinal: Abdomen soft, moderately diffusely tender, moderately distended, tympanic to percussion with no rebound tenderness or guarding   : No visible inguinal hernias appreciated   Rectal: No visible or palpable lesion, adequate rectal tone, stool present on examining finger, no blood noted   Neurological: GCS 15 (E4V5M6) with no gross sensory, motor or coordination deficits   Psychiatric: Normal mood and affect   Musculoskeletal: No joint pain, swelling or deformity with no limitation of movement

## 2020-02-11 NOTE — ED ADULT NURSE NOTE - OBJECTIVE STATEMENT
Patient presents to ED with  c/o lower abd pain, n/v with chills, lethargic  x 1 day, lethargic generalized malaise noted. PMH of hernia repair and gastric bypass x 3 years ago. Patient presents to ED A/Ox4, VSS, denies chest pain or sob.  Respirations are even and unlabored, lungs cta, +bowel x4 quads, abdomen distended/ tender, skin w/d/i.

## 2020-02-11 NOTE — CONSULT NOTE ADULT - ATTENDING COMMENTS
45 yo F with gastric bypass in 2015 presenting with SBO and underwent Laparoscopic IKE and found to have nonspecific small bowel dilation with 1 lysis of adhesion. Surgery complicated by 3 enterotomies and kept intubated for gastric content in the oral cavity concerning for aspiration.    Intubated, vented, and sedated.     AVSS    PUL: Vented, CTA  CV: Tachy and reg  GI: Mild distension, appropriate incision tenderness and incision are C/D/I    PLAN:   1. Continue propofol and fentanyl for sedation/pain control  2. Continue Vent management and plan for AM extubation  3. Aspiration precautions-- keep HOB at 35-45  4. SICU care for airway and vent management      code [id number]            CCT 40

## 2020-02-11 NOTE — ED PROVIDER NOTE - OBJECTIVE STATEMENT
pt is a 45 y/o female with a pmhx of gastric bypass (4 years ago) and hernia repair ( 2 years ago) presenting for evaluation. pt with chronic abdominal pain since hernia surgery, states it has been worsening. pt saw surgeon at [facility] around 2- 3 weeks ago ct scan negative, pt has follow up with surgeon soon. pt states tonight pain became severe, nausea. pt passing flatus. pt denies cp, SOB, fevers, diarrhea, constipation, back pain, dysuria, rectal bleeding, melena

## 2020-02-11 NOTE — H&P ADULT - ATTENDING COMMENTS
Small bowel obstruction in setting of prior gastric bypass  Plan for emergent diagnostic laparoscopy, possible laparotomy, possible bowel resection, possible ostomy  Risks/benefits discussed with patient. She appears to understand, agrees, and wishes to proceed.

## 2020-02-11 NOTE — ED ADULT TRIAGE NOTE - CHIEF COMPLAINT QUOTE
Patient is alert and oriented x4 c/o lower abd pain xfew hours. appears uncomfortable. c/o n.v with chills as well today. denies chest pain or sob. breathing unlabored. color normal for ethnicity. pale in color. lethargic. generalized malaise noted. Patient is alert and oriented x4 c/o lower abd pain xfew hours. appears uncomfortable. c/o n.v with chills as well today. denies chest pain or sob. breathing unlabored. color normal for ethnicity. pale in color. lethargic. generalized malaise noted. HX OF hernia repair and gastric bypass.

## 2020-02-11 NOTE — BRIEF OPERATIVE NOTE - NSICDXBRIEFPOSTOP_GEN_ALL_CORE_FT
POST-OP DIAGNOSIS:  SBO (small bowel obstruction) 11-Feb-2020 17:12:59  Hanna Belcher
POST-OP DIAGNOSIS:  SBO (small bowel obstruction) 11-Feb-2020 17:12:59  Hanna Belcher

## 2020-02-11 NOTE — ED ADULT NURSE NOTE - CHIEF COMPLAINT QUOTE
Patient is alert and oriented x4 c/o lower abd pain xfew hours. appears uncomfortable. c/o n.v with chills as well today. denies chest pain or sob. breathing unlabored. color normal for ethnicity. pale in color. lethargic. generalized malaise noted. HX OF hernia repair and gastric bypass.

## 2020-02-11 NOTE — CONSULT NOTE ADULT - SUBJECTIVE AND OBJECTIVE BOX
SICU Consultation Note  =====================================================  44yF with h/o gastric bypass in 2015 presenting w/ abd pain for 1 day, dull diffuse constant pain associated 2 episodes NBNB. +flatus, +normal BM's. Had similar episode in 2017 concerning for SBO in darrel limb, admitted for a day and passed PO challenge. This time, repeat CT showed SBO with TP in the mid abdomen with concern for mild extra and intrahepatic biliary ductal dilatation including a dilated CBD 2/2 compression by adjacent stomach and proximal small bowel. Pt was taken back for a diagnostic lap, lysis of adhesion c/b 3 enterotomies closed with primary repair and 3 serosal tears. At time for extubation, anesthesia noticed gastric contents in the oral cavity and d/t concern for aspiration, kept the patient intubated    Surgery Information   EBL: 10      UOP:     250         IV Fluids:  3L     Blood Products:       none      PAST MEDICAL & SURGICAL HISTORY:  Kidney infection  Obese body habitus  H/O gastric bypass    Home Meds:   Allergies: Vicodin (Swelling)    Soc:   Advanced Directives: Presumed Full Code     ROS:  INTUBATED  General: Non-Contributory  Skin/Breast: Non-Contributory  Ophthalmologic: Non-Contributory  ENMT: Non-Contributory  Respiratory and Thorax: Non-Contributory  Cardiovascular: Non-Contributory  Gastrointestinal: Non-Contributory  Genitourinary: Non-Contributory  Musculoskeletal: Non-Contributory  Neurological: Non-Contributory  Psychiatric: Non-Contributory  Hematology/Lymphatics: Non-Contributory  Endocrine: Non-Contributory  Allergic/Immunologic: Non-Contributory    CURRENT MEDICATIONS:   --------------------------------------------------------------------------------------  Neurologic Medications  acetaminophen  Suppository .. 650 milliGRAM(s) Rectal every 6 hours PRN Temp greater or equal to 38C (100.4F), Mild Pain (1 - 3)    Respiratory Medications    Cardiovascular Medications    Gastrointestinal Medications  multiple electrolytes Injection Type 1 1000 milliLiter(s) IV Continuous <Continuous>    Genitourinary Medications    Hematologic/Oncologic Medications    Antimicrobial/Immunologic Medications  cefoTEtan  IVPB 2 Gram(s) IV Intermittent every 12 hours    Endocrine/Metabolic Medications    Topical/Other Medications  chlorhexidine 0.12% Liquid 15 milliLiter(s) Oral Mucosa every 12 hours  chlorhexidine 2% Cloths 1 Application(s) Topical <User Schedule>    --------------------------------------------------------------------------------------    VITAL SIGNS, INS/OUTS (last 24 hours):  ICU Vital Signs Last 24 Hrs  T(C): 36.7 (11 Feb 2020 12:58), Max: 37.1 (11 Feb 2020 04:20)  T(F): 98 (11 Feb 2020 12:58), Max: 98.8 (11 Feb 2020 04:20)  HR: 93 (11 Feb 2020 17:20) (67 - 93)  BP: 115/74 (11 Feb 2020 12:58) (97/67 - 118/77)  BP(mean): --  ABP: --  ABP(mean): --  RR: 16 (11 Feb 2020 12:58) (16 - 20)  SpO2: 99% (11 Feb 2020 17:20) (95% - 99%)      --------------------------------------------------------------------------------------    EXAM:  General/Neuro  RASS: neg  GCS: 3T  Exam: NAD, sedated on propofol.     Respiratory  Exam: Lungs clear to auscultation, Normal expansion/effort.  ***  [] Tracheostomy   [x] Intubated  Mechanical Ventilation: Mode: AC/ RR: 16, TV (machine): 380, FiO2: 40, PEEP: 8, MAP: 11, PIP: 24    Cardiovascular  Exam: S1, S2.  Regular rate and rhythm.   Cardiac Rhythm: Normal Sinus Rhythm  ECHO:     GI  Exam: Abdomen soft, Non-tender, Non-distended.  Current Diet:  NPO      Tubes/Lines/Drains  ***  [x] Peripheral IV  [] Central Venous Line     	[] R	[] L	[] IJ	[] Fem	[] SC        Type:	    Date Placed:   [] Arterial Line		[] R	[] L	[] Fem	[] Rad	[] Ax	Date Placed:   [] PICC:         	[] Midline		[] Mediport           [] Urinary Catheter		Date Placed:     Extremities  Exam: Extremities warm, pink, well-perfused.        Derm:  Exam: Good skin turgor, no skin breakdown.      :   Exam: Ozuna catheter in place.     LABS  --------------------------------------------------------------------------------------  pending  --------------------------------------------------------------------------------------    OTHER LABS    IMAGING RESULTS      ASSESSMENT:  44y Female with gastric bypass in 2015 presenting with SBO, found to have nonspecific small bowel dilation with 1 lysis of adhesion. surgery complicated by 3 enterotomies and kept intubated for gastric content in the oral cavity concerning for aspiration    PLAN:   Neurologic: propofol and fentanyl for sedation/pain control  Respiratory: Currently on AC 16/380/8/40%  Cardiovascular: remain normotensive  Gastrointestinal/Nutrition: NO NGT!! remain npo for now. keep HOB at 35-45  Renal/Genitourinary: keep ozuna in place  Hematologic: repeat labs pending. keep hgb>8  Infectious Disease: cefotetan for 24 hrs  Lines/Tubes: ETT, ozuna  Endocrine: blood glucose every 6 hrs while npo  Disposition: SICU    --------------------------------------------------------------------------------------    Critical Care Diagnoses:

## 2020-02-11 NOTE — ED PROVIDER NOTE - ATTENDING CONTRIBUTION TO CARE
45 yo uncomfortable appearing female with chronic abdominal pain presenting with exacerbation/change in nature ( stating abdomen feels sore and not sharp) of her abdominal pain. I personally saw the patient with the PA, and completed the key components of the history and physical exam. I then discussed the management plan with the PA.

## 2020-02-11 NOTE — BRIEF OPERATIVE NOTE - NSICDXBRIEFPROCEDURE_GEN_ALL_CORE_FT
PROCEDURES:  Diagnostic laparoscopy 11-Feb-2020 17:19:10  Justin Jorgensen  Repair, enterotomy, laparoscopic 11-Feb-2020 17:12:32 x 3 Hanna Belcher  Lysis of adhesions of peritoneum 11-Feb-2020 17:12:02  Hanna Belcher  Diagnostic laparoscopy 11-Feb-2020 17:11:14  Hanna Belcher
PROCEDURES:  Repair, enterotomy, laparoscopic 11-Feb-2020 17:12:32 x 3 Hanna Belcher  Lysis of adhesions of peritoneum 11-Feb-2020 17:12:02  Hanna Belcher  Diagnostic laparoscopy 11-Feb-2020 17:11:14  Hanna Belcher

## 2020-02-11 NOTE — ED PROVIDER NOTE - PROGRESS NOTE DETAILS
Patient signed out to Dr. Jimenez, pending CT. Barbara PARKS- pt seen by bariartic team for aucte sbo and will be taken to OR for resection of sbo

## 2020-02-11 NOTE — BRIEF OPERATIVE NOTE - OPERATION/FINDINGS
Patient w/ dilated, inflamed, and friable bowel. 3 x serosal tears and 3 x enterotomies made inadvertanlly due to friable bowel. Several adhesions lysed including large ant abd wall adhesion. Postoperatively, patient noted to have regurgitated contents in her oropharynx and thus decision made for patient to stay intubated w/ plan to slowly extubate in SICU. pt   PLAN  - cefotetan x 24 hours  - NPO  - Miralax + Senna ATC  - Lovenox BID
Velasquez placed without difficulty. Diagnostic laparoscopy with lysis of adhesions and laparoscopic repair of enterotomy x 3. Entire length of small bowel ran 3 times. No transition point identified. small bowel diffusely friable. enterotomy x3 repaired laparoscopically with silk sutures. Peritoneal cavity irrigated. Before extubation, anesthesia noted feculant material in oropharynx during suctioning. Decision was made to leave patient intubated and transfer to ICU post operatively    Wound class 3  Velasquez output: 250  Blood loss: 10 cc

## 2020-02-11 NOTE — H&P ADULT - ASSESSMENT
44 year old female s/p 5yrs gastric bypass now with SBO requiring operative intervention (diagnostic laparoscopy) On call bariatric surgeon notified and en route.

## 2020-02-11 NOTE — PATIENT PROFILE ADULT - NSPROMUTINFOINDIVIDFT_GEN_A_NUR
Patient has family history of myotonic dystrophy Patient has family history of myotonic dystrophy. Patient has had issues with SBO since hernia repair with mesh 2 years ago.

## 2020-02-11 NOTE — H&P ADULT - HISTORY OF PRESENT ILLNESS
44 year old female presenting with chief complaint of abdominal pain x 1 day. Describes pain as dull, diffuse constant pain associated with 2 episodes of non bloody yellow emesis. Reports that she is passing flatus and having normal BMs (last one yesterday) Patient has had 2 previous colonoscopies (FamHx of CRC) which were normal. ROS negative for fever, chills, chest pain, SOB, dyspnea and dysuria.      Of note, patient was seen for similar reasons in 2017 where imaging at the time was concerning for SBO in darrel limb, admitted for a day and passed PO challenge then asked to follow up with her bariatric surgeon Dr. Avery at [place] which she did and no surgical intervention was pursued at that time. Repeat symptoms brought her back to the ED 2019 and imaging was negative.     PMHx: Obesity   PSHx: Gastric bypass (Dr. Bennie Garza) [place]. 5yrs ago (), umbilical hernia repair with mesh [place]. 2-3 yrs ago (same location same surgeon)   Allergies: Vicodin (tongue swelling)   Home Meds: None  FamHx: Colon CA in mother Dx @ age 52; Father  @ age 59 from [place]   SocHx: Never smoker, denies EtOH and illicit drug use 44 year old female presenting with chief complaint of abdominal pain x 1 day. Describes pain as dull, diffuse constant pain associated with 2 episodes of non bloody yellow emesis. Reports that she is passing flatus and having normal BMs (last one yesterday) Patient has had 2 previous colonoscopies (FamHx of CRC) which were normal. ROS negative for fever, chills, chest pain, SOB, dyspnea and dysuria.      Of note, patient was seen for similar reasons in 2017 where imaging at the time was concerning for SBO in darrel limb, admitted for a day and passed PO challenge then asked to follow up with her bariatric surgeon Dr. Avery at [place] which she did and no surgical intervention was pursued at that time. Repeat symptoms brought her back to the ED 2019 and imaging was negative.     PMHx: Obesity (lost 78lbs after surgery)   PSHx: Gastric bypass (Dr. Avery [facility]) [place]. 5yrs ago (), umbilical hernia repair with mesh [place]. 2-3 yrs ago (same location same surgeon) C sectionsx3  Allergies: Vicodin (tongue swelling)   Home Meds: None  FamHx: Colon CA in mother Dx @ age 52; Father  @ age 59 from [place]   SocHx: Never smoker, denies EtOH and illicit drug use

## 2020-02-11 NOTE — BRIEF OPERATIVE NOTE - NSICDXBRIEFPREOP_GEN_ALL_CORE_FT
PRE-OP DIAGNOSIS:  SBO (small bowel obstruction) 11-Feb-2020 17:12:46  Hanna Belcher
PRE-OP DIAGNOSIS:  SBO (small bowel obstruction) 11-Feb-2020 17:12:46  Hanna Belcher

## 2020-02-12 LAB
ANION GAP SERPL CALC-SCNC: 16 MMOL/L — SIGNIFICANT CHANGE UP (ref 5–17)
BASOPHILS # BLD AUTO: 0 K/UL — SIGNIFICANT CHANGE UP (ref 0–0.2)
BASOPHILS NFR BLD AUTO: 0 % — SIGNIFICANT CHANGE UP (ref 0–2)
BUN SERPL-MCNC: 8 MG/DL — SIGNIFICANT CHANGE UP (ref 8–20)
CALCIUM SERPL-MCNC: 7.9 MG/DL — LOW (ref 8.6–10.2)
CHLORIDE SERPL-SCNC: 102 MMOL/L — SIGNIFICANT CHANGE UP (ref 98–107)
CO2 SERPL-SCNC: 19 MMOL/L — LOW (ref 22–29)
CREAT SERPL-MCNC: 0.33 MG/DL — LOW (ref 0.5–1.3)
EOSINOPHIL # BLD AUTO: 0 K/UL — SIGNIFICANT CHANGE UP (ref 0–0.5)
EOSINOPHIL NFR BLD AUTO: 0 % — SIGNIFICANT CHANGE UP (ref 0–6)
GAS PNL BLDA: SIGNIFICANT CHANGE UP
GIANT PLATELETS BLD QL SMEAR: PRESENT — SIGNIFICANT CHANGE UP
GLUCOSE BLDC GLUCOMTR-MCNC: 109 MG/DL — HIGH (ref 70–99)
GLUCOSE BLDC GLUCOMTR-MCNC: 125 MG/DL — HIGH (ref 70–99)
GLUCOSE BLDC GLUCOMTR-MCNC: 155 MG/DL — HIGH (ref 70–99)
GLUCOSE SERPL-MCNC: 220 MG/DL — HIGH (ref 70–99)
HCT VFR BLD CALC: 36.2 % — SIGNIFICANT CHANGE UP (ref 34.5–45)
HGB BLD-MCNC: 11.7 G/DL — SIGNIFICANT CHANGE UP (ref 11.5–15.5)
LYMPHOCYTES # BLD AUTO: 0.1 K/UL — LOW (ref 1–3.3)
LYMPHOCYTES # BLD AUTO: 1.8 % — LOW (ref 13–44)
MAGNESIUM SERPL-MCNC: 1.8 MG/DL — SIGNIFICANT CHANGE UP (ref 1.8–2.6)
MANUAL SMEAR VERIFICATION: SIGNIFICANT CHANGE UP
MCHC RBC-ENTMCNC: 29.2 PG — SIGNIFICANT CHANGE UP (ref 27–34)
MCHC RBC-ENTMCNC: 32.3 GM/DL — SIGNIFICANT CHANGE UP (ref 32–36)
MCV RBC AUTO: 90.3 FL — SIGNIFICANT CHANGE UP (ref 80–100)
METAMYELOCYTES # FLD: 3.5 % — HIGH (ref 0–0)
MONOCYTES # BLD AUTO: 0.14 K/UL — SIGNIFICANT CHANGE UP (ref 0–0.9)
MONOCYTES NFR BLD AUTO: 2.6 % — SIGNIFICANT CHANGE UP (ref 2–14)
NEUTROPHILS # BLD AUTO: 4.97 K/UL — SIGNIFICANT CHANGE UP (ref 1.8–7.4)
NEUTROPHILS NFR BLD AUTO: 75.4 % — SIGNIFICANT CHANGE UP (ref 43–77)
NEUTS BAND # BLD: 16.7 % — HIGH (ref 0–8)
PHOSPHATE SERPL-MCNC: 2.4 MG/DL — SIGNIFICANT CHANGE UP (ref 2.4–4.7)
PLAT MORPH BLD: NORMAL — SIGNIFICANT CHANGE UP
PLATELET # BLD AUTO: 133 K/UL — LOW (ref 150–400)
POTASSIUM SERPL-MCNC: 4 MMOL/L — SIGNIFICANT CHANGE UP (ref 3.5–5.3)
POTASSIUM SERPL-SCNC: 4 MMOL/L — SIGNIFICANT CHANGE UP (ref 3.5–5.3)
RBC # BLD: 4.01 M/UL — SIGNIFICANT CHANGE UP (ref 3.8–5.2)
RBC # FLD: 13.3 % — SIGNIFICANT CHANGE UP (ref 10.3–14.5)
RBC BLD AUTO: NORMAL — SIGNIFICANT CHANGE UP
SODIUM SERPL-SCNC: 137 MMOL/L — SIGNIFICANT CHANGE UP (ref 135–145)
WBC # BLD: 5.4 K/UL — SIGNIFICANT CHANGE UP (ref 3.8–10.5)
WBC # FLD AUTO: 5.4 K/UL — SIGNIFICANT CHANGE UP (ref 3.8–10.5)

## 2020-02-12 PROCEDURE — 99024 POSTOP FOLLOW-UP VISIT: CPT

## 2020-02-12 RX ORDER — DEXTROSE 50 % IN WATER 50 %
25 SYRINGE (ML) INTRAVENOUS ONCE
Refills: 0 | Status: DISCONTINUED | OUTPATIENT
Start: 2020-02-12 | End: 2020-02-14

## 2020-02-12 RX ORDER — GLUCAGON INJECTION, SOLUTION 0.5 MG/.1ML
1 INJECTION, SOLUTION SUBCUTANEOUS ONCE
Refills: 0 | Status: DISCONTINUED | OUTPATIENT
Start: 2020-02-12 | End: 2020-02-14

## 2020-02-12 RX ORDER — MAGNESIUM SULFATE 500 MG/ML
1 VIAL (ML) INJECTION ONCE
Refills: 0 | Status: COMPLETED | OUTPATIENT
Start: 2020-02-12 | End: 2020-02-12

## 2020-02-12 RX ORDER — POLYETHYLENE GLYCOL 3350 17 G/17G
17 POWDER, FOR SOLUTION ORAL DAILY
Refills: 0 | Status: DISCONTINUED | OUTPATIENT
Start: 2020-02-12 | End: 2020-02-14

## 2020-02-12 RX ORDER — KETOROLAC TROMETHAMINE 30 MG/ML
15 SYRINGE (ML) INJECTION EVERY 8 HOURS
Refills: 0 | Status: DISCONTINUED | OUTPATIENT
Start: 2020-02-12 | End: 2020-02-14

## 2020-02-12 RX ORDER — PANTOPRAZOLE SODIUM 20 MG/1
40 TABLET, DELAYED RELEASE ORAL DAILY
Refills: 0 | Status: DISCONTINUED | OUTPATIENT
Start: 2020-02-12 | End: 2020-02-14

## 2020-02-12 RX ORDER — DEXMEDETOMIDINE HYDROCHLORIDE IN 0.9% SODIUM CHLORIDE 4 UG/ML
0.3 INJECTION INTRAVENOUS
Qty: 200 | Refills: 0 | Status: DISCONTINUED | OUTPATIENT
Start: 2020-02-12 | End: 2020-02-12

## 2020-02-12 RX ORDER — CHLORHEXIDINE GLUCONATE 213 G/1000ML
15 SOLUTION TOPICAL EVERY 12 HOURS
Refills: 0 | Status: DISCONTINUED | OUTPATIENT
Start: 2020-02-12 | End: 2020-02-12

## 2020-02-12 RX ORDER — CEFOTETAN DISODIUM 1 G
2 VIAL (EA) INJECTION ONCE
Refills: 0 | Status: COMPLETED | OUTPATIENT
Start: 2020-02-12 | End: 2020-02-12

## 2020-02-12 RX ORDER — INSULIN LISPRO 100/ML
VIAL (ML) SUBCUTANEOUS EVERY 6 HOURS
Refills: 0 | Status: DISCONTINUED | OUTPATIENT
Start: 2020-02-12 | End: 2020-02-14

## 2020-02-12 RX ORDER — ACETAMINOPHEN 500 MG
650 TABLET ORAL EVERY 6 HOURS
Refills: 0 | Status: DISCONTINUED | OUTPATIENT
Start: 2020-02-12 | End: 2020-02-14

## 2020-02-12 RX ORDER — HYDROMORPHONE HYDROCHLORIDE 2 MG/ML
0.5 INJECTION INTRAMUSCULAR; INTRAVENOUS; SUBCUTANEOUS ONCE
Refills: 0 | Status: DISCONTINUED | OUTPATIENT
Start: 2020-02-12 | End: 2020-02-12

## 2020-02-12 RX ORDER — CALCIUM GLUCONATE 100 MG/ML
2 VIAL (ML) INTRAVENOUS ONCE
Refills: 0 | Status: COMPLETED | OUTPATIENT
Start: 2020-02-12 | End: 2020-02-12

## 2020-02-12 RX ADMIN — Medication 15 MILLIGRAM(S): at 23:28

## 2020-02-12 RX ADMIN — Medication 15 MILLIGRAM(S): at 13:24

## 2020-02-12 RX ADMIN — CHLORHEXIDINE GLUCONATE 1 APPLICATION(S): 213 SOLUTION TOPICAL at 05:08

## 2020-02-12 RX ADMIN — SODIUM CHLORIDE 125 MILLILITER(S): 9 INJECTION, SOLUTION INTRAVENOUS at 11:18

## 2020-02-12 RX ADMIN — Medication 650 MILLIGRAM(S): at 17:41

## 2020-02-12 RX ADMIN — Medication 1: at 11:16

## 2020-02-12 RX ADMIN — Medication 100 GRAM(S): at 06:21

## 2020-02-12 RX ADMIN — Medication 2: at 05:25

## 2020-02-12 RX ADMIN — Medication 100 GRAM(S): at 05:08

## 2020-02-12 RX ADMIN — Medication 650 MILLIGRAM(S): at 12:00

## 2020-02-12 RX ADMIN — ENOXAPARIN SODIUM 40 MILLIGRAM(S): 100 INJECTION SUBCUTANEOUS at 11:16

## 2020-02-12 RX ADMIN — HYDROMORPHONE HYDROCHLORIDE 0.5 MILLIGRAM(S): 2 INJECTION INTRAMUSCULAR; INTRAVENOUS; SUBCUTANEOUS at 08:30

## 2020-02-12 RX ADMIN — Medication 62.5 MILLIMOLE(S): at 06:51

## 2020-02-12 RX ADMIN — POLYETHYLENE GLYCOL 3350 17 GRAM(S): 17 POWDER, FOR SOLUTION ORAL at 11:17

## 2020-02-12 RX ADMIN — CHLORHEXIDINE GLUCONATE 15 MILLILITER(S): 213 SOLUTION TOPICAL at 05:08

## 2020-02-12 RX ADMIN — SODIUM CHLORIDE 125 MILLILITER(S): 9 INJECTION, SOLUTION INTRAVENOUS at 17:42

## 2020-02-12 RX ADMIN — PANTOPRAZOLE SODIUM 40 MILLIGRAM(S): 20 TABLET, DELAYED RELEASE ORAL at 13:24

## 2020-02-12 RX ADMIN — SODIUM CHLORIDE 500 MILLILITER(S): 9 INJECTION, SOLUTION INTRAVENOUS at 00:40

## 2020-02-12 RX ADMIN — Medication 200 GRAM(S): at 05:07

## 2020-02-12 RX ADMIN — HYDROMORPHONE HYDROCHLORIDE 0.5 MILLIGRAM(S): 2 INJECTION INTRAMUSCULAR; INTRAVENOUS; SUBCUTANEOUS at 08:15

## 2020-02-12 RX ADMIN — Medication 62.5 MILLIMOLE(S): at 01:47

## 2020-02-12 RX ADMIN — Medication 100 GRAM(S): at 17:40

## 2020-02-12 RX ADMIN — Medication 15 MILLIGRAM(S): at 21:09

## 2020-02-12 RX ADMIN — Medication 650 MILLIGRAM(S): at 11:17

## 2020-02-12 NOTE — PROGRESS NOTE ADULT - ASSESSMENT
44y Female with sbo, respiratory failure after surgery. 44y Female with sbo, respiratory failure after surgery.    Neuro: tylenol and toradol ATC for pain control, avoid narcotics as much as possible  Pulm: oob ambulate  Card: no new issues  GI: npo for now awaiting bowel function  Renal: non-gap acidosis noted, continue IVF  ID: Cefotetan  for SCIP  ppx: lovenox for dvt ppx  Dispo: ok for transfer to floor level of care

## 2020-02-12 NOTE — PROGRESS NOTE ADULT - ASSESSMENT
43 yo F w/ Hx of gastric bypass who presented with SBO now s/p laparoscopy, IKE, enterotomy repair x 3    Keep NPO except medications  NO NGT at any time  IVF resuscitation  BMP daily  Pain control - Tylenol/Toradol, NO NARCOTICS  Velasquez removed, TOV pending  Awaiting bowel function  PT

## 2020-02-12 NOTE — PHYSICAL THERAPY INITIAL EVALUATION ADULT - CRITERIA FOR SKILLED THERAPEUTIC INTERVENTIONS
Discontinue Lisinopril and take blood pressure once a day.  Goal is below 140/90.    Bloodwork at your convenience.  Fast for 8 hours prior.    Will call when Dr. Anderson responds.    Thanks for seeing me,  Sruthi Raya PA-C   impairments found/functional limitations in following categories

## 2020-02-12 NOTE — PHYSICAL THERAPY INITIAL EVALUATION ADULT - GENERAL OBSERVATIONS, REHAB EVAL
Pt received in bed + IV, bilateral venous compression boots, breathing on RA in NAD, in 3/10 pain, agreeable to PT evaluation

## 2020-02-12 NOTE — PHYSICAL THERAPY INITIAL EVALUATION ADULT - ADDITIONAL COMMENTS
Pt lives in a house with 1 steps to enter with no rails and stairs inside but all needs are met on first floor.  Pt owns no medical equipment.  Pt lives with: , mother in law, and children, available to assist if needed.  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

## 2020-02-12 NOTE — PHYSICAL THERAPY INITIAL EVALUATION ADULT - PERTINENT HX OF CURRENT PROBLEM, REHAB EVAL
44y Female with gastric bypass in 2015 presenting with SBO, found to have nonspecific small bowel dilation with 1 lysis of adhesion. surgery complicated by 3 enterotomies and kept intubated for gastric content in the oral cavity concerning for aspiration

## 2020-02-12 NOTE — AIRWAY REMOVAL NOTE  ADULT & PEDS - ARTIFICAL AIRWAY REMOVAL COMMENTS
Pt passed SAT & SBT 8/5 40% Pt successfully extubated,  able to speak and produce a good cough. Resting comfortably SpO2 99% on 40%CAM ; will continue to monitor

## 2020-02-12 NOTE — CHART NOTE - NSCHARTNOTEFT_GEN_A_CORE
SICU TRANSFER NOTE  -----------------------------  ICU Admission Date: 2/12/2020  Transfer Date: 02-12-20 @ 13:01    Admission Diagnosis: SBO with aspiration risk    Active Problems/injuries: SBO    Procedures: 2/11- diagnostic lap, IKE, repair of enterotomies    Consultants:  [ ] Cardiology  [ ] Endocrine  [ ] Infectious Disease  [ ] Medicine  [ ]Neurosurgery  [ ] Ortho       [ ] Weight Bearing Status:  [ ] Palliative       [ ] Advanced Directives:    [ ] Physical Medicine and Rehab       [ ] Disposition :   [ ] Plastics  [ ] Pulmonary    Medications  acetaminophen   Tablet .. 650 milliGRAM(s) Oral every 6 hours  cefoTEtan  IVPB 2 Gram(s) IV Intermittent every 12 hours  chlorhexidine 2% Cloths 1 Application(s) Topical <User Schedule>  dextrose 50% Injectable 25 Gram(s) IV Push once  dextrose 50% Injectable 25 Gram(s) IV Push once  enoxaparin Injectable 40 milliGRAM(s) SubCutaneous daily  glucagon  Injectable 1 milliGRAM(s) IntraMuscular once PRN  insulin lispro (HumaLOG) corrective regimen sliding scale   SubCutaneous every 6 hours  ketorolac   Injectable 15 milliGRAM(s) IV Push every 8 hours  multiple electrolytes Injection Type 1 1000 milliLiter(s) IV Continuous <Continuous>  pantoprazole  Injectable 40 milliGRAM(s) IV Push daily  polyethylene glycol 3350 17 Gram(s) Oral daily      [ x] I attest I have reviewed and reconciled all medications prior to transfer        Antibiotics:  cefoTEtan  IVPB 2 Gram(s) IV Intermittent every 12 hours    Indication: abd sx with concern for leakage End Date: tbd      I have discussed this case with Dr. Zhu upon transfer and all questions regarding ICU course were answered.  The following items are to be followed up:  Aspiration precaution  bowel function  high glucose levels

## 2020-02-13 LAB
ANION GAP SERPL CALC-SCNC: 10 MMOL/L — SIGNIFICANT CHANGE UP (ref 5–17)
BUN SERPL-MCNC: 11 MG/DL — SIGNIFICANT CHANGE UP (ref 8–20)
CALCIUM SERPL-MCNC: 8.3 MG/DL — LOW (ref 8.6–10.2)
CHLORIDE SERPL-SCNC: 106 MMOL/L — SIGNIFICANT CHANGE UP (ref 98–107)
CO2 SERPL-SCNC: 25 MMOL/L — SIGNIFICANT CHANGE UP (ref 22–29)
CREAT SERPL-MCNC: 0.28 MG/DL — LOW (ref 0.5–1.3)
GLUCOSE BLDC GLUCOMTR-MCNC: 73 MG/DL — SIGNIFICANT CHANGE UP (ref 70–99)
GLUCOSE BLDC GLUCOMTR-MCNC: 93 MG/DL — SIGNIFICANT CHANGE UP (ref 70–99)
GLUCOSE SERPL-MCNC: 107 MG/DL — HIGH (ref 70–99)
HBA1C BLD-MCNC: 5.4 % — SIGNIFICANT CHANGE UP (ref 4–5.6)
MAGNESIUM SERPL-MCNC: 2.5 MG/DL — SIGNIFICANT CHANGE UP (ref 1.8–2.6)
PHOSPHATE SERPL-MCNC: 1.9 MG/DL — LOW (ref 2.4–4.7)
POTASSIUM SERPL-MCNC: 4.1 MMOL/L — SIGNIFICANT CHANGE UP (ref 3.5–5.3)
POTASSIUM SERPL-SCNC: 4.1 MMOL/L — SIGNIFICANT CHANGE UP (ref 3.5–5.3)
SODIUM SERPL-SCNC: 141 MMOL/L — SIGNIFICANT CHANGE UP (ref 135–145)

## 2020-02-13 PROCEDURE — 99024 POSTOP FOLLOW-UP VISIT: CPT

## 2020-02-13 RX ORDER — SODIUM CHLORIDE 9 MG/ML
1000 INJECTION, SOLUTION INTRAVENOUS
Refills: 0 | Status: DISCONTINUED | OUTPATIENT
Start: 2020-02-13 | End: 2020-02-14

## 2020-02-13 RX ORDER — SODIUM,POTASSIUM PHOSPHATES 278-250MG
1 POWDER IN PACKET (EA) ORAL
Refills: 0 | Status: COMPLETED | OUTPATIENT
Start: 2020-02-13 | End: 2020-02-13

## 2020-02-13 RX ADMIN — Medication 1 TABLET(S): at 17:28

## 2020-02-13 RX ADMIN — Medication 1 TABLET(S): at 21:28

## 2020-02-13 RX ADMIN — Medication 15 MILLIGRAM(S): at 14:48

## 2020-02-13 RX ADMIN — PANTOPRAZOLE SODIUM 40 MILLIGRAM(S): 20 TABLET, DELAYED RELEASE ORAL at 11:37

## 2020-02-13 RX ADMIN — Medication 650 MILLIGRAM(S): at 12:20

## 2020-02-13 RX ADMIN — Medication 15 MILLIGRAM(S): at 06:04

## 2020-02-13 RX ADMIN — Medication 15 MILLIGRAM(S): at 22:00

## 2020-02-13 RX ADMIN — Medication 650 MILLIGRAM(S): at 11:37

## 2020-02-13 RX ADMIN — CHLORHEXIDINE GLUCONATE 1 APPLICATION(S): 213 SOLUTION TOPICAL at 06:05

## 2020-02-13 RX ADMIN — ENOXAPARIN SODIUM 40 MILLIGRAM(S): 100 INJECTION SUBCUTANEOUS at 11:37

## 2020-02-13 RX ADMIN — Medication 1 TABLET(S): at 14:48

## 2020-02-13 RX ADMIN — SODIUM CHLORIDE 125 MILLILITER(S): 9 INJECTION, SOLUTION INTRAVENOUS at 12:11

## 2020-02-13 RX ADMIN — Medication 15 MILLIGRAM(S): at 21:27

## 2020-02-13 RX ADMIN — Medication 1 TABLET(S): at 12:11

## 2020-02-13 RX ADMIN — Medication 15 MILLIGRAM(S): at 15:45

## 2020-02-13 RX ADMIN — POLYETHYLENE GLYCOL 3350 17 GRAM(S): 17 POWDER, FOR SOLUTION ORAL at 14:49

## 2020-02-13 RX ADMIN — Medication 15 MILLIGRAM(S): at 06:32

## 2020-02-13 RX ADMIN — Medication 650 MILLIGRAM(S): at 17:28

## 2020-02-13 RX ADMIN — Medication 650 MILLIGRAM(S): at 17:29

## 2020-02-13 NOTE — PROGRESS NOTE ADULT - ATTENDING COMMENTS
Agree with note above    NPO except meds  Await bowel function  Pain control: tylenol, toradol. Minimize narcotics  Ambulation, IS  DVT ppx
Pt again seen by surgical team and myself at 3pm    She passed gas and had a bowel movement. Reports good appetite. Denies pain  Abdomen soft, nontender  Advance to clear liquid diet  Possible regular diet tomorrow if clears is well-tolerated  Ambulation, IS
43yo F with PMH of obesity and s/p GBP presents with SBO is now s/p Dx Lap IKE with repair of enterotomies.    She came to the SICU intubated and was extubated this AM. Now, she is AAOx3, NAD, GCS 15.    AVSS    PUL: CTA  CV: RRR  GI: mild distension, laparoscopic incision c/d/i, appropriate incision tenderness    Plan   1. NPO xcept meds  2. Awaiting bowel function  3. OOB to chair and  ambulate  4. Continue Cefotetan  for SCIP  5. Continue lovenox for dvt ppx  6. Stable to transfer to floor        code [id number]

## 2020-02-13 NOTE — PROGRESS NOTE ADULT - ASSESSMENT
45 yo F w/ Hx of gastric bypass who presented with SBO now POD2 s/p laparoscopy, IKE, enterotomy repair x 3    Keep NPO except medications and miralax  NO NGT at any time  continue IVF - D545 + 20KCl @125  BMP daily while NPO - replete electrolytes PRN  Pain control - Tylenol/Toradol, NO NARCOTICS  Awaiting bowel function  PT - Home

## 2020-02-14 ENCOUNTER — TRANSCRIPTION ENCOUNTER (OUTPATIENT)
Age: 45
End: 2020-02-14

## 2020-02-14 VITALS
HEART RATE: 85 BPM | RESPIRATION RATE: 18 BRPM | TEMPERATURE: 97 F | DIASTOLIC BLOOD PRESSURE: 86 MMHG | OXYGEN SATURATION: 90 % | SYSTOLIC BLOOD PRESSURE: 120 MMHG

## 2020-02-14 LAB
ANION GAP SERPL CALC-SCNC: 11 MMOL/L — SIGNIFICANT CHANGE UP (ref 5–17)
BUN SERPL-MCNC: 5 MG/DL — LOW (ref 8–20)
CALCIUM SERPL-MCNC: 8.7 MG/DL — SIGNIFICANT CHANGE UP (ref 8.6–10.2)
CHLORIDE SERPL-SCNC: 100 MMOL/L — SIGNIFICANT CHANGE UP (ref 98–107)
CO2 SERPL-SCNC: 26 MMOL/L — SIGNIFICANT CHANGE UP (ref 22–29)
CREAT SERPL-MCNC: <0.2 MG/DL — LOW (ref 0.5–1.3)
GLUCOSE BLDC GLUCOMTR-MCNC: 109 MG/DL — HIGH (ref 70–99)
GLUCOSE BLDC GLUCOMTR-MCNC: 140 MG/DL — HIGH (ref 70–99)
GLUCOSE SERPL-MCNC: 126 MG/DL — HIGH (ref 70–99)
MAGNESIUM SERPL-MCNC: 2.1 MG/DL — SIGNIFICANT CHANGE UP (ref 1.8–2.6)
PHOSPHATE SERPL-MCNC: 1.5 MG/DL — LOW (ref 2.4–4.7)
POTASSIUM SERPL-MCNC: 3.8 MMOL/L — SIGNIFICANT CHANGE UP (ref 3.5–5.3)
POTASSIUM SERPL-SCNC: 3.8 MMOL/L — SIGNIFICANT CHANGE UP (ref 3.5–5.3)
SODIUM SERPL-SCNC: 137 MMOL/L — SIGNIFICANT CHANGE UP (ref 135–145)

## 2020-02-14 PROCEDURE — 83036 HEMOGLOBIN GLYCOSYLATED A1C: CPT

## 2020-02-14 PROCEDURE — 86901 BLOOD TYPING SEROLOGIC RH(D): CPT

## 2020-02-14 PROCEDURE — 97163 PT EVAL HIGH COMPLEX 45 MIN: CPT

## 2020-02-14 PROCEDURE — 86923 COMPATIBILITY TEST ELECTRIC: CPT

## 2020-02-14 PROCEDURE — 82803 BLOOD GASES ANY COMBINATION: CPT

## 2020-02-14 PROCEDURE — 83690 ASSAY OF LIPASE: CPT

## 2020-02-14 PROCEDURE — 84295 ASSAY OF SERUM SODIUM: CPT

## 2020-02-14 PROCEDURE — 96375 TX/PRO/DX INJ NEW DRUG ADDON: CPT

## 2020-02-14 PROCEDURE — 80048 BASIC METABOLIC PNL TOTAL CA: CPT

## 2020-02-14 PROCEDURE — 85014 HEMATOCRIT: CPT

## 2020-02-14 PROCEDURE — 93005 ELECTROCARDIOGRAM TRACING: CPT

## 2020-02-14 PROCEDURE — 84100 ASSAY OF PHOSPHORUS: CPT

## 2020-02-14 PROCEDURE — 99285 EMERGENCY DEPT VISIT HI MDM: CPT | Mod: 25

## 2020-02-14 PROCEDURE — 94003 VENT MGMT INPAT SUBQ DAY: CPT

## 2020-02-14 PROCEDURE — 82330 ASSAY OF CALCIUM: CPT

## 2020-02-14 PROCEDURE — 80053 COMPREHEN METABOLIC PANEL: CPT

## 2020-02-14 PROCEDURE — 82947 ASSAY GLUCOSE BLOOD QUANT: CPT

## 2020-02-14 PROCEDURE — 94002 VENT MGMT INPAT INIT DAY: CPT

## 2020-02-14 PROCEDURE — 82962 GLUCOSE BLOOD TEST: CPT

## 2020-02-14 PROCEDURE — 96374 THER/PROPH/DIAG INJ IV PUSH: CPT | Mod: XU

## 2020-02-14 PROCEDURE — 36415 COLL VENOUS BLD VENIPUNCTURE: CPT

## 2020-02-14 PROCEDURE — 84132 ASSAY OF SERUM POTASSIUM: CPT

## 2020-02-14 PROCEDURE — 86900 BLOOD TYPING SEROLOGIC ABO: CPT

## 2020-02-14 PROCEDURE — 85027 COMPLETE CBC AUTOMATED: CPT

## 2020-02-14 PROCEDURE — 94760 N-INVAS EAR/PLS OXIMETRY 1: CPT

## 2020-02-14 PROCEDURE — 96361 HYDRATE IV INFUSION ADD-ON: CPT

## 2020-02-14 PROCEDURE — 84702 CHORIONIC GONADOTROPIN TEST: CPT

## 2020-02-14 PROCEDURE — 83735 ASSAY OF MAGNESIUM: CPT

## 2020-02-14 PROCEDURE — 82435 ASSAY OF BLOOD CHLORIDE: CPT

## 2020-02-14 PROCEDURE — 83605 ASSAY OF LACTIC ACID: CPT

## 2020-02-14 PROCEDURE — 74177 CT ABD & PELVIS W/CONTRAST: CPT

## 2020-02-14 PROCEDURE — 96376 TX/PRO/DX INJ SAME DRUG ADON: CPT

## 2020-02-14 PROCEDURE — 74019 RADEX ABDOMEN 2 VIEWS: CPT

## 2020-02-14 PROCEDURE — 86850 RBC ANTIBODY SCREEN: CPT

## 2020-02-14 RX ORDER — ACETAMINOPHEN 500 MG
2 TABLET ORAL
Qty: 0 | Refills: 0 | DISCHARGE
Start: 2020-02-14

## 2020-02-14 RX ORDER — LANOLIN ALCOHOL/MO/W.PET/CERES
5 CREAM (GRAM) TOPICAL AT BEDTIME
Refills: 0 | Status: DISCONTINUED | OUTPATIENT
Start: 2020-02-14 | End: 2020-02-14

## 2020-02-14 RX ADMIN — Medication 5 MILLIGRAM(S): at 02:31

## 2020-02-14 RX ADMIN — CHLORHEXIDINE GLUCONATE 1 APPLICATION(S): 213 SOLUTION TOPICAL at 05:22

## 2020-02-14 RX ADMIN — Medication 15 MILLIGRAM(S): at 05:22

## 2020-02-14 RX ADMIN — ENOXAPARIN SODIUM 40 MILLIGRAM(S): 100 INJECTION SUBCUTANEOUS at 12:15

## 2020-02-14 RX ADMIN — PANTOPRAZOLE SODIUM 40 MILLIGRAM(S): 20 TABLET, DELAYED RELEASE ORAL at 12:15

## 2020-02-14 RX ADMIN — Medication 15 MILLIGRAM(S): at 05:38

## 2020-02-14 RX ADMIN — POLYETHYLENE GLYCOL 3350 17 GRAM(S): 17 POWDER, FOR SOLUTION ORAL at 12:15

## 2020-02-14 NOTE — DISCHARGE NOTE PROVIDER - NSDCCPCAREPLAN_GEN_ALL_CORE_FT
PRINCIPAL DISCHARGE DIAGNOSIS  Diagnosis: Small bowel obstruction  Assessment and Plan of Treatment: Follow up: Please call and make an appointment with Dr. Li at the [facility] 10-14 days after discharge. Also, please call and make an appointment with your primary care physician as per your usual schedule.   Activity: May return to normal activities as tolerated, however refrain from heavy lifting > 10-15 lbs.  Diet: May continue regular diet as tolerated.  Medications: Resume all home medications. You can take over the counter tylenol per instructions on bottle as needed for postoperative pain.   Wound Care: Please, keep wound site clean and dry. You may shower, but do not bathe.   Patient is advised to RETURN TO THE EMERGENCY DEPARTMENT for any of the following - worsening pain, fever/chills, nausea/vomiting, altered mental status, chest pain, shortness of breath, or any other new / worsening symptom. PRINCIPAL DISCHARGE DIAGNOSIS  Diagnosis: Small bowel obstruction  Assessment and Plan of Treatment: Follow up: Please call and make an appointment with Dr. Li at the [facility] 10-14 days after discharge. Also, please call and make an appointment with your primary care physician as per your usual schedule.   Activity: May return to normal activities as tolerated, however refrain from heavy lifting > 10-15 lbs.  Diet: May continue regular diet as tolerated.  Medications: Resume all home medications. You can take over the counter tylenol per instructions on bottle as needed for postoperative pain. If you notice increasing constipation, take Miralax daily until constipation resolves.  Wound Care: Please, keep wound site clean and dry. You may shower, but do not bathe.   Patient is advised to RETURN TO THE EMERGENCY DEPARTMENT for any of the following - worsening pain, fever/chills, nausea/vomiting, altered mental status, chest pain, shortness of breath, or any other new / worsening symptom.

## 2020-02-14 NOTE — PROGRESS NOTE ADULT - ASSESSMENT
45 yo F w/ Hx of multiple abdominal surgeries presented with SBO symptoms now s/p Laparoscopy, IKE and repair of multiple enterotomies    RD  Pain control  Daily miralax  Having bowel function  PT- Cleared for Home  DC Home today

## 2020-02-14 NOTE — PROGRESS NOTE ADULT - SUBJECTIVE AND OBJECTIVE BOX
HPI/OVERNIGHT EVENTS: no acute events overnight. Patient reports mild vilma-incisional abdominal pain which she noted is improved and of different quality than pre-op. patient ambulating and voiding spontaneously. Patient remains NPO except for meds. -BM/-flatus, denies f/c/sob/n/v. avss, hemodynamically stable    MEDICATIONS  (STANDING):  acetaminophen   Tablet .. 650 milliGRAM(s) Oral every 6 hours  chlorhexidine 2% Cloths 1 Application(s) Topical <User Schedule>  dextrose 5% + sodium chloride 0.45% 1000 milliLiter(s) (125 mL/Hr) IV Continuous <Continuous>  dextrose 50% Injectable 25 Gram(s) IV Push once  dextrose 50% Injectable 25 Gram(s) IV Push once  enoxaparin Injectable 40 milliGRAM(s) SubCutaneous daily  insulin lispro (HumaLOG) corrective regimen sliding scale   SubCutaneous every 6 hours  ketorolac   Injectable 15 milliGRAM(s) IV Push every 8 hours  pantoprazole  Injectable 40 milliGRAM(s) IV Push daily  polyethylene glycol 3350 17 Gram(s) Oral daily    MEDICATIONS  (PRN):  glucagon  Injectable 1 milliGRAM(s) IntraMuscular once PRN Glucose LESS THAN 70 milligrams/deciliter      Vital Signs Last 24 Hrs  T(C): 36.7 (13 Feb 2020 07:33), Max: 37.3 (12 Feb 2020 14:36)  T(F): 98 (13 Feb 2020 07:33), Max: 99.2 (12 Feb 2020 14:36)  HR: 92 (13 Feb 2020 07:33) (73 - 92)  BP: 114/75 (13 Feb 2020 07:33) (92/55 - 114/75)  BP(mean): 70 (12 Feb 2020 14:00) (68 - 77)  RR: 18 (13 Feb 2020 07:33) (15 - 30)  SpO2: 95% (13 Feb 2020 07:33) (91% - 98%)    Gen: NAD, laying comfortably  Neurological: AAOx3, sensations intact  Pulmonary: non-labored, no accessory muscle use, no conversational dyspnea  Cardiovascular: NSR  Gastrointestinal: appropriately TTP diffusely, softly distended, port sites with dermabond c/d/i, no guarding or rebound.   Genitourinary: ozuna out    I&O's Detail    12 Feb 2020 07:01  -  13 Feb 2020 07:00  --------------------------------------------------------  IN:    multiple electrolytes Injection Type 1multiple electrolytes Injection Type 1: 1125 mL    Oral Fluid: 60 mL    Solution: 250 mL  Total IN: 1435 mL    OUT:    Indwelling Catheter - Urethral: 730 mL    Voided: 350 mL  Total OUT: 1080 mL    Total NET: 355 mL          LABS:                        11.7   5.40  )-----------( 133      ( 12 Feb 2020 04:31 )             36.2     02-13    141  |  106  |  11.0  ----------------------------<  107<H>  4.1   |  25.0  |  0.28<L>    Ca    8.3<L>      13 Feb 2020 06:49  Phos  1.9     02-13  Mg     2.5     02-13
INTERVAL HPI/OVERNIGHT EVENTS/SUBJECTIVE:  POD 1 diagnostic lap with IKE, enterotomy x 3 repaired. Pt seen and examined at bedside this AM. Extubated this AM. Reports feeling well, offers no complaints. good UOP overnight. No nausea or vomiting. No flatus or BM yet.   Denies cp, sob, n/v/d, HA, f/c.     ICU Vital Signs Last 24 Hrs  T(C): 37.7 (12 Feb 2020 04:02), Max: 37.7 (12 Feb 2020 04:02)  T(F): 99.8 (12 Feb 2020 04:02), Max: 99.8 (12 Feb 2020 04:02)  HR: 92 (12 Feb 2020 06:10) (77 - 107)  BP: 117/71 (12 Feb 2020 06:00) (98/53 - 135/87)  BP(mean): 89 (12 Feb 2020 06:00) (81 - 105)  ABP: --  ABP(mean): --  RR: 16 (12 Feb 2020 06:00) (16 - 22)  SpO2: 100% (12 Feb 2020 06:10) (96% - 100%)      I&O's Detail    11 Feb 2020 07:01  -  12 Feb 2020 06:56  --------------------------------------------------------  IN:    fentaNYL  Infusion: 36.4 mL    IV PiggyBack: 50 mL    multiple electrolytes Injection Type 1: 1750 mL    multiple electrolytes Injection Type 1 Bolus: 500 mL    propofol Infusion: 112.4 mL    Solution: 250 mL    Solution: 100 mL    Solution: 50 mL  Total IN: 2848.8 mL    OUT:    Voided: 1795 mL  Total OUT: 1795 mL    Total NET: 1053.8 mL      Mode: CPAP with PS  FiO2: 40  PEEP: 5  PS: 8  MAP: 8  PIP: 15    ABG - ( 12 Feb 2020 03:43 )  pH, Arterial: 7.43  pH, Blood: x     /  pCO2: 34    /  pO2: 148   / HCO3: 24    / Base Excess: -1.0  /  SaO2: 97        MEDICATIONS  (STANDING):  cefoTEtan  IVPB 2 Gram(s) IV Intermittent every 12 hours  chlorhexidine 2% Cloths 1 Application(s) Topical <User Schedule>  dexMEDEtomidine Infusion 0.3 MICROgram(s)/kG/Hr (4.553 mL/Hr) IV Continuous <Continuous>  dextrose 50% Injectable 25 Gram(s) IV Push once  dextrose 50% Injectable 25 Gram(s) IV Push once  enoxaparin Injectable 40 milliGRAM(s) SubCutaneous daily  fentaNYL   Infusion 0.5 MICROgram(s)/kG/Hr (2.765 mL/Hr) IV Continuous <Continuous>  insulin lispro (HumaLOG) corrective regimen sliding scale   SubCutaneous every 6 hours  multiple electrolytes Injection Type 1 1000 milliLiter(s) (125 mL/Hr) IV Continuous <Continuous>    MEDICATIONS  (PRN):  acetaminophen  Suppository .. 650 milliGRAM(s) Rectal every 6 hours PRN Temp greater or equal to 38C (100.4F), Mild Pain (1 - 3)  glucagon  Injectable 1 milliGRAM(s) IntraMuscular once PRN Glucose LESS THAN 70 milligrams/deciliter    MISC:     PHYSICAL EXAM:    Gen: NAD, laying comfortably  Neurological: AAOx3, sensations intact  ENMT: extubated, on venti mask  Pulmonary: non-labored, no accessory muscle use, no conversational dyspnea  Cardiovascular: NSR  Gastrointestinal: appropriately TTP diffusely, softly distended, port sites with dermabond c/d/i, no guarding or rebound.   Genitourinary: ozuna in place with output as recorded  Extremities: moving all extremities     LABS:  CBC Full  -  ( 12 Feb 2020 04:31 )  WBC Count : 5.40 K/uL  RBC Count : 4.01 M/uL  Hemoglobin : 11.7 g/dL  Hematocrit : 36.2 %  Platelet Count - Automated : 133 K/uL  Mean Cell Volume : 90.3 fl  Mean Cell Hemoglobin : 29.2 pg  Mean Cell Hemoglobin Concentration : 32.3 gm/dL  Auto Neutrophil # : 4.97 K/uL  Auto Lymphocyte # : 0.10 K/uL  Auto Monocyte # : 0.14 K/uL  Auto Eosinophil # : 0.00 K/uL  Auto Basophil # : 0.00 K/uL  Auto Neutrophil % : 75.4 %  Auto Lymphocyte % : 1.8 %  Auto Monocyte % : 2.6 %  Auto Eosinophil % : 0.0 %  Auto Basophil % : 0.0 %    02-12    137  |  102  |  8.0  ----------------------------<  220<H>  4.0   |  19.0<L>  |  0.33<L>    Ca    7.9<L>      12 Feb 2020 04:31  Phos  2.4     02-12  Mg     1.8     02-12    TPro  7.8  /  Alb  4.6  /  TBili  0.3<L>  /  DBili  x   /  AST  51<H>  /  ALT  45<H>  /  AlkPhos  158<H>  02-11      LIVER FUNCTIONS - ( 11 Feb 2020 05:02 )  Alb: 4.6 g/dL / Pro: 7.8 g/dL / ALK PHOS: 158 U/L / ALT: 45 U/L / AST: 51 U/L / GGT: x           ASSESSMENT/PLAN:  44yFemale presenting with: SBO. s/p diagnostic lap with IKE, enterotomy repair x 3.   - monitor for bowel function  -pain control prn  -likely remove ozuna today  -likely advance to [facility] today - will discuss with SICU and colorectal attending  -iss, oob, ambulate  -continue meds  -d/w attending
SUBJECTIVE: ALICE overnight. Pain is well controlled. Tolerating regular diet, denies nausea/vomiting. Passing flatus and having BM.       MEDICATIONS  (STANDING):  acetaminophen   Tablet .. 650 milliGRAM(s) Oral every 6 hours  chlorhexidine 2% Cloths 1 Application(s) Topical <User Schedule>  dextrose 50% Injectable 25 Gram(s) IV Push once  dextrose 50% Injectable 25 Gram(s) IV Push once  enoxaparin Injectable 40 milliGRAM(s) SubCutaneous daily  insulin lispro (HumaLOG) corrective regimen sliding scale   SubCutaneous every 6 hours  ketorolac   Injectable 15 milliGRAM(s) IV Push every 8 hours  melatonin 5 milliGRAM(s) Oral at bedtime  pantoprazole  Injectable 40 milliGRAM(s) IV Push daily  polyethylene glycol 3350 17 Gram(s) Oral daily    MEDICATIONS  (PRN):  glucagon  Injectable 1 milliGRAM(s) IntraMuscular once PRN Glucose LESS THAN 70 milligrams/deciliter      Vital Signs Last 24 Hrs  T(C): 36.7 (14 Feb 2020 05:00), Max: 36.7 (13 Feb 2020 07:33)  T(F): 98 (14 Feb 2020 05:00), Max: 98 (13 Feb 2020 07:33)  HR: 88 (14 Feb 2020 05:00) (88 - 92)  BP: 137/97 (14 Feb 2020 05:00) (114/75 - 143/98)  BP(mean): --  RR: 18 (14 Feb 2020 05:00) (18 - 18)  SpO2: 93% (14 Feb 2020 05:00) (82% - 95%)    PE  Gen: AOX3, NAD  Pulm: Non labored breathing  CV: RRR, s1 and s2  Abd: Soft, mild distension, incisions c/d/i, non-tender  Ext: Moving all 4 extremities  Vasc: +2 DP/PT pulses bilaterally  Neuro: Good affect      I&O's Detail    13 Feb 2020 07:01  -  14 Feb 2020 07:00  --------------------------------------------------------  IN:    dextrose 5% + sodium chloride 0.45%: 1250 mL    Oral Fluid: 200 mL  Total IN: 1450 mL    OUT:    Voided: 400 mL  Total OUT: 400 mL    Total NET: 1050 mL          LABS:    02-13    141  |  106  |  11.0  ----------------------------<  107<H>  4.1   |  25.0  |  0.28<L>    Ca    8.3<L>      13 Feb 2020 06:49  Phos  1.9     02-13  Mg     2.5     02-13            RADIOLOGY & ADDITIONAL STUDIES:
SUBJECTIVE: Patient was recently downgraded from SICU to floor. Doing well, states pain has significantly improved. Currently NPO, denies nausea/vomiting. -F/-BM.       MEDICATIONS  (STANDING):  acetaminophen   Tablet .. 650 milliGRAM(s) Oral every 6 hours  cefoTEtan  IVPB 2 Gram(s) IV Intermittent every 12 hours  chlorhexidine 2% Cloths 1 Application(s) Topical <User Schedule>  dextrose 50% Injectable 25 Gram(s) IV Push once  dextrose 50% Injectable 25 Gram(s) IV Push once  enoxaparin Injectable 40 milliGRAM(s) SubCutaneous daily  insulin lispro (HumaLOG) corrective regimen sliding scale   SubCutaneous every 6 hours  ketorolac   Injectable 15 milliGRAM(s) IV Push every 8 hours  multiple electrolytes Injection Type 1 1000 milliLiter(s) (125 mL/Hr) IV Continuous <Continuous>  pantoprazole  Injectable 40 milliGRAM(s) IV Push daily  polyethylene glycol 3350 17 Gram(s) Oral daily    MEDICATIONS  (PRN):  glucagon  Injectable 1 milliGRAM(s) IntraMuscular once PRN Glucose LESS THAN 70 milligrams/deciliter      Vital Signs Last 24 Hrs  T(C): 37.3 (12 Feb 2020 14:36), Max: 37.7 (12 Feb 2020 04:02)  T(F): 99.2 (12 Feb 2020 14:36), Max: 99.8 (12 Feb 2020 04:02)  HR: 82 (12 Feb 2020 14:36) (73 - 107)  BP: 93/52 (12 Feb 2020 14:36) (92/55 - 135/87)  BP(mean): 70 (12 Feb 2020 14:00) (68 - 105)  RR: 21 (12 Feb 2020 14:36) (15 - 30)  SpO2: 93% (12 Feb 2020 14:36) (93% - 100%)    PE  Gen: AOX3, NAD	  Pulm: Non labored breathing  CV: RRR, s1 and s2  Abd: Soft, moderate distension, incisions c/d/i, appropriately tender at incision sites  Ext: Moving all 4 extremities  Vasc: +2 DP/PT pulses bilaterally  Neuro: Good affect      I&O's Detail    11 Feb 2020 07:01  -  12 Feb 2020 07:00  --------------------------------------------------------  IN:    fentaNYL  Infusion: 36.4 mL    IV PiggyBack: 50 mL    multiple electrolytes Injection Type 1: 1750 mL    multiple electrolytes Injection Type 1 Bolus: 500 mL    propofol Infusion: 112.4 mL    Solution: 100 mL    Solution: 50 mL    Solution: 375 mL  Total IN: 2973.8 mL    OUT:    Voided: 2120 mL  Total OUT: 2120 mL    Total NET: 853.8 mL      12 Feb 2020 07:01  -  12 Feb 2020 14:48  --------------------------------------------------------  IN:    multiple electrolytes Injection Type 1: 875 mL    Oral Fluid: 60 mL    Solution: 250 mL  Total IN: 1185 mL    OUT:    Indwelling Catheter - Urethral: 730 mL  Total OUT: 730 mL    Total NET: 455 mL          LABS:                        11.7   5.40  )-----------( 133      ( 12 Feb 2020 04:31 )             36.2     02-12    137  |  102  |  8.0  ----------------------------<  220<H>  4.0   |  19.0<L>  |  0.33<L>    Ca    7.9<L>      12 Feb 2020 04:31  Phos  2.4     02-12  Mg     1.8     02-12    TPro  7.8  /  Alb  4.6  /  TBili  0.3<L>  /  DBili  x   /  AST  51<H>  /  ALT  45<H>  /  AlkPhos  158<H>  02-11          RADIOLOGY & ADDITIONAL STUDIES:
HISTORY  44y Female with sbo    24 HOUR EVENTS: remained intubated overnight, extubated in the early morning hours. Doing well, pain well controlled.     SUBJECTIVE/ROS:  [ x ] A ten-point review of systems was otherwise negative except as noted.  [ ] Due to altered mental status/intubation, subjective information were not able to be obtained from the patient. History was obtained, to the extent possible, from review of the chart and collateral sources of information.      NEURO  RASS: 0    GCS:  15   CAM ICU: neg  Exam: non focal  Meds: acetaminophen   Tablet .. 650 milliGRAM(s) Oral every 6 hours  ketorolac   Injectable 15 milliGRAM(s) IV Push every 8 hours    [x] Adequacy of sedation and pain control has been assessed and adjusted      RESPIRATORY  RR: 15 (02-12-20 @ 10:00) (15 - 22)  SpO2: 98% (02-12-20 @ 10:00) (96% - 100%)  Wt(kg): --  Exam: unlabored, clear to auscultation bilaterally  Mechanical Ventilation: Mode: CPAP with PS, RR (patient): 10, FiO2: 40, PEEP: 5, PS: 8, MAP: 8, PIP: 15  ABG - ( 12 Feb 2020 03:43 )  pH: 7.43  /  pCO2: 34    /  pO2: 148   / HCO3: 24    / Base Excess: -1.0  /  SaO2: 97      Lactate: x                [ ] Extubation Readiness Assessed  Meds:       CARDIOVASCULAR  HR: 77 (02-12-20 @ 10:00) (77 - 107)  BP: 92/55 (02-12-20 @ 10:00) (92/55 - 135/87)  BP(mean): 68 (02-12-20 @ 10:00) (68 - 105)  ABP: --  ABP(mean): --  Wt(kg): --  CVP(cm H2O): --  VBG - ( 11 Feb 2020 09:26 )  pH: x     /  pCO2: x     /  pO2: x     / HCO3: x     / Base Excess: x     /  SaO2: x      Lactate: 1.7                Exam: s1s2   Cardiac Rhythm: Sinus  Perfusion     [ x ]Adequate   [ ]Inadequate  Mentation   [ x ]Normal       [ ]Reduced  Extremities  [ x ]Warm         [ ]Cool  Volume Status [ ]Hypervolemic [ x ]Euvolemic [ ]Hypovolemic  Meds:       GI/NUTRITION  Exam: Softly distended  Diet: NPO x meds  Meds: polyethylene glycol 3350 17 Gram(s) Oral daily      GENITOURINARY  I&O's Detail    02-11 @ 07:01  -  02-12 @ 07:00  --------------------------------------------------------  IN:    fentaNYL  Infusion: 36.4 mL    IV PiggyBack: 50 mL    multiple electrolytes Injection Type 1: 1750 mL    multiple electrolytes Injection Type 1 Bolus: 500 mL    propofol Infusion: 112.4 mL    Solution: 100 mL    Solution: 50 mL    Solution: 375 mL  Total IN: 2973.8 mL    OUT:    Voided: 2120 mL  Total OUT: 2120 mL    Total NET: 853.8 mL      02-12 @ 07:01 - 02-12 @ 10:37  --------------------------------------------------------  IN:    multiple electrolytes Injection Type 1: 375 mL    Solution: 187.5 mL  Total IN: 562.5 mL    OUT:    Indwelling Catheter - Urethral: 160 mL    Voided: 510 mL  Total OUT: 670 mL    Total NET: -107.5 mL        Weight (kg): 60.7 (02-11 @ 17:30)  02-12    137  |  102  |  8.0  ----------------------------<  220<H>  4.0   |  19.0<L>  |  0.33<L>    Ca    7.9<L>      12 Feb 2020 04:31  Phos  2.4     02-12  Mg     1.8     02-12    TPro  7.8  /  Alb  4.6  /  TBili  0.3<L>  /  DBili  x   /  AST  51<H>  /  ALT  45<H>  /  AlkPhos  158<H>  02-11    [ ] Velasquez catheter, indication: N/A  Meds: multiple electrolytes Injection Type 1 1000 milliLiter(s) IV Continuous <Continuous>        HEMATOLOGIC  Meds: enoxaparin Injectable 40 milliGRAM(s) SubCutaneous daily    [x] VTE Prophylaxis                        11.7   5.40  )-----------( 133      ( 12 Feb 2020 04:31 )             36.2       Transfusion     [ ] PRBC   [ ] Platelets   [ ] FFP   [ ] Cryoprecipitate      INFECTIOUS DISEASES  T(C): 36.9 (02-12-20 @ 08:00), Max: 37.7 (02-12-20 @ 04:02)  Wt(kg): --  WBC Count: 5.40 K/uL (02-12 @ 04:31)  WBC Count: 2.93 K/uL (02-11 @ 20:31)    Recent Cultures:    Meds: cefoTEtan  IVPB 2 Gram(s) IV Intermittent every 12 hours        ENDOCRINE  Capillary Blood Glucose    Meds: dextrose 50% Injectable 25 Gram(s) IV Push once  dextrose 50% Injectable 25 Gram(s) IV Push once  glucagon  Injectable 1 milliGRAM(s) IntraMuscular once PRN  insulin lispro (HumaLOG) corrective regimen sliding scale   SubCutaneous every 6 hours        ACCESS DEVICES:  [ x ] Peripheral IV  [ ] Central Venous Line	[ ] R	[ ] L	[ ] IJ	[ ] Fem	[ ] SC	Placed:   [ ] Arterial Line		[ ] R	[ ] L	[ ] Fem	[ ] Rad	[ ] Ax	Placed:   [ ] PICC:					[ ] Mediport  [ ] Urinary Catheter, Date Placed:   [ ] Necessity of urinary, arterial, and venous catheters discussed    OTHER MEDICATIONS:  chlorhexidine 2% Cloths 1 Application(s) Topical <User Schedule>      CODE STATUS:     IMAGING:    ____ minutes of critical care time spent providing medical care for patient's acute illness/conditions that impairs at least one vital organ system and/or poses a high risk of imminent or life threatening deterioration in the patient's condition. It includes time spent evaluating and treating the patient's acute illness as well as time spent reviewing labs, radiology, discussing goals of care with patient and/or patient's family, and discussing the case with a multidisciplinary team in an effort to prevent further life threatening deterioration or end organ damage. This time is independent of any procedures performed.

## 2020-02-14 NOTE — DISCHARGE NOTE PROVIDER - HOSPITAL COURSE
44 year old female with PMHx significant for multiple prior intraabdominal surgeries including gastric bypass surgery and hernia repair with mesh, as well as hx of normal colonoscopies presented to ED 2/11/20 with chief complaint of abdominal pain x 1 day. Patient noted the pain to be dull, diffuse and constant in nature and associated with 2 episodes of non bloody yellow emesis. At time of presentation, she was passing flatus and having normal BMs.        Imaging obtained in ED showed small bowel obstruction with transition point at mid abdomen. Non-operative intervention with NGT decompression was not pursued given her history of previous bypass surgery, and patient was prepped for OR intervention. Intraoperatively, the patient was found to have dilated, inflamed, and friable bowel. Extensive IKE was performed and 3 x serosal tears and 3 x enterotomies were made inadvertently due to friable bowel. Postoperatively, patient noted to have regurgitated contents in her oropharynx and thus decision made for patient to stay intubated w/ plan to slowly extubate in SICU.         On POD1, patient was successfully extubated with good UOP postoperatively and improving clinical exam. Velasquez catheter was removed and patient was able to urinate without difficulty following removal. Patient was downgraded from SICU to general surgical floor and continued to do well postoperatively. Patient was evaluated by physical therapy and cleared for home.         Patient passing flatus and having bowel movements on POD2 and was advanced to a clear liquid diet.         On POD3, patient was advanced to a regular diet and was tolerating meals well without n/v or increased abdominal pain. Her pain is well controlled on oral medications at this time, she is tolerating a diet, ambulating on own, voiding appropriately and having adequate bowel function. She is stable for discharge to home at this time with follow up in clinic as outpatient.

## 2020-02-14 NOTE — DISCHARGE NOTE PROVIDER - CARE PROVIDER_API CALL
Steve Li)  Surgery  Bariatric  [address] [place] [phone]  Phone: [phone]  Fax: [phone]  Follow Up Time: 1 week

## 2020-02-14 NOTE — DISCHARGE NOTE NURSING/CASE MANAGEMENT/SOCIAL WORK - PATIENT PORTAL LINK FT
You can access the FollowMyHealth Patient Portal offered by [facility] by registering at the following website: [email or web address]. By joining [facility]’s FollowMyHealth portal, you will also be able to view your health information using other applications (apps) compatible with our system.

## 2020-02-19 DIAGNOSIS — Z98.84 BARIATRIC SURGERY STATUS: ICD-10-CM

## 2020-02-19 DIAGNOSIS — Z48.89 ENCOUNTER FOR OTHER SPECIFIED SURGICAL AFTERCARE: ICD-10-CM

## 2020-02-19 DIAGNOSIS — K46.9 UNSPECIFIED ABDOMINAL HERNIA W/OUT OBSTRUCTION OR GANGRENE: ICD-10-CM

## 2020-02-19 DIAGNOSIS — K56.600 PARTIAL INTESTINAL OBSTRUCTION, UNSPECIFIED AS TO CAUSE: ICD-10-CM

## 2020-02-19 PROBLEM — Z00.00 ENCOUNTER FOR PREVENTIVE HEALTH EXAMINATION: Status: ACTIVE | Noted: 2020-02-19

## 2020-02-20 ENCOUNTER — APPOINTMENT (OUTPATIENT)
Dept: SURGERY | Facility: CLINIC | Age: 45
End: 2020-02-20
Payer: MEDICAID

## 2020-02-20 VITALS
HEIGHT: 58 IN | OXYGEN SATURATION: 70 % | TEMPERATURE: 97.4 F | SYSTOLIC BLOOD PRESSURE: 106 MMHG | BODY MASS INDEX: 26.24 KG/M2 | HEART RATE: 71 BPM | DIASTOLIC BLOOD PRESSURE: 71 MMHG | WEIGHT: 125 LBS

## 2020-02-20 DIAGNOSIS — Z78.9 OTHER SPECIFIED HEALTH STATUS: ICD-10-CM

## 2020-02-20 PROCEDURE — 99024 POSTOP FOLLOW-UP VISIT: CPT

## 2020-02-20 NOTE — ASSESSMENT
[FreeTextEntry1] : Ms. VICENTE is a 44 year old woman who presented with small bowel obstruction s/p diagnostic laparoscopy, lysis of adhesions, and repair on enterotomy x 2 on 2/11/2020, who is doing well

## 2020-02-20 NOTE — PLAN
[FreeTextEntry1] : Return to office as needed\par May return to work on 3/2/20, no heavy lifting until 3/9/20

## 2020-02-20 NOTE — HISTORY OF PRESENT ILLNESS
[de-identified] : Ms. VICENTE is a 44 year old woman who presented with small bowel obstruction s/p diagnostic laparoscopy, lysis of adhesions, and repair on enterotomy x 2 on 2/11/2020, who comes in today for postop visit. Today, she is doing well. Denies pain. Denies fever/chills. No redness or pain at incision sites. Tolerating diet. Normal bowel movements.

## 2020-02-20 NOTE — PHYSICAL EXAM
[JVD] : no jugular venous distention  [No Rash or Lesion] : No rash or lesion [Alert] : alert [Oriented to Place] : oriented to place [Oriented to Person] : oriented to person [Oriented to Time] : oriented to time [Calm] : calm [de-identified] : Regular rate [de-identified] : No respiratory distress [de-identified] : No acute distress [de-identified] : soft, nontender. no rebound or guarding. incisions c/d/i [de-identified] : normal range of motion

## 2020-02-25 ENCOUNTER — APPOINTMENT (OUTPATIENT)
Dept: SURGERY | Facility: CLINIC | Age: 45
End: 2020-02-25
Payer: MEDICAID

## 2020-02-25 VITALS
TEMPERATURE: 98 F | SYSTOLIC BLOOD PRESSURE: 108 MMHG | HEIGHT: 58 IN | HEART RATE: 73 BPM | OXYGEN SATURATION: 97 % | WEIGHT: 124 LBS | DIASTOLIC BLOOD PRESSURE: 73 MMHG | BODY MASS INDEX: 26.03 KG/M2

## 2020-02-25 DIAGNOSIS — R10.9 UNSPECIFIED ABDOMINAL PAIN: ICD-10-CM

## 2020-02-25 PROCEDURE — 99024 POSTOP FOLLOW-UP VISIT: CPT

## 2020-02-25 RX ORDER — PANTOPRAZOLE 40 MG/1
40 TABLET, DELAYED RELEASE ORAL DAILY
Qty: 30 | Refills: 2 | Status: ACTIVE | COMMUNITY
Start: 2020-02-25 | End: 1900-01-01

## 2020-02-25 NOTE — PHYSICAL EXAM
[JVD] : no jugular venous distention  [No Rash or Lesion] : No rash or lesion [Alert] : alert [Oriented to Person] : oriented to person [Oriented to Place] : oriented to place [Oriented to Time] : oriented to time [Calm] : calm [de-identified] : No acute distress [de-identified] : Regular rate [de-identified] : soft, nontender. no rebound or guarding. incisions c/d/i [de-identified] : No respiratory distress [de-identified] : normal range of motion

## 2020-02-25 NOTE — ASSESSMENT
[FreeTextEntry1] : Ms. VICENTE is a 44 year old woman who presented with small bowel obstruction s/p diagnostic laparoscopy, lysis of adhesions, and repair on enterotomy x 2 on 2/11/2020, who complains of abdominal pain

## 2020-02-25 NOTE — HISTORY OF PRESENT ILLNESS
[de-identified] : Ms. VICENTE is a 44 year old woman who presented with small bowel obstruction s/p diagnostic laparoscopy, lysis of adhesions, and repair on enterotomy x 2 on 2/11/2020, seen last week for postop appointment and was doing well who returns today with complaints of abdominal pain. Reports episode of severe pain last night that resolved spontaneously. Today, complains of moderate pain at left upper quadrant incision site. Denies fever/chills. No redness or pain at incision sites. Tolerating diet. Normal bowel movements.

## 2020-02-26 ENCOUNTER — EMERGENCY (EMERGENCY)
Facility: HOSPITAL | Age: 45
LOS: 1 days | Discharge: HOME | End: 2020-02-26
Attending: EMERGENCY MEDICINE
Payer: MEDICAID

## 2020-02-26 VITALS
SYSTOLIC BLOOD PRESSURE: 101 MMHG | TEMPERATURE: 98 F | HEART RATE: 69 BPM | DIASTOLIC BLOOD PRESSURE: 65 MMHG | RESPIRATION RATE: 17 BRPM | OXYGEN SATURATION: 98 %

## 2020-02-26 VITALS
SYSTOLIC BLOOD PRESSURE: 102 MMHG | OXYGEN SATURATION: 97 % | DIASTOLIC BLOOD PRESSURE: 66 MMHG | RESPIRATION RATE: 20 BRPM | TEMPERATURE: 98 F | HEIGHT: 58 IN | WEIGHT: 125 LBS | HEART RATE: 82 BPM

## 2020-02-26 DIAGNOSIS — Z98.890 OTHER SPECIFIED POSTPROCEDURAL STATES: Chronic | ICD-10-CM

## 2020-02-26 LAB
ALBUMIN SERPL ELPH-MCNC: 4.1 G/DL — SIGNIFICANT CHANGE UP (ref 3.3–5.2)
ALP SERPL-CCNC: 265 U/L — HIGH (ref 40–120)
ALT FLD-CCNC: 57 U/L — HIGH
ANION GAP SERPL CALC-SCNC: 12 MMOL/L — SIGNIFICANT CHANGE UP (ref 5–17)
APPEARANCE UR: ABNORMAL
APTT BLD: 27 SEC — LOW (ref 27.5–36.3)
AST SERPL-CCNC: 73 U/L — HIGH
BACTERIA # UR AUTO: ABNORMAL
BASOPHILS # BLD AUTO: 0.03 K/UL — SIGNIFICANT CHANGE UP (ref 0–0.2)
BASOPHILS NFR BLD AUTO: 0.5 % — SIGNIFICANT CHANGE UP (ref 0–2)
BILIRUB DIRECT SERPL-MCNC: 0.2 MG/DL — SIGNIFICANT CHANGE UP (ref 0–0.3)
BILIRUB INDIRECT FLD-MCNC: 0.4 MG/DL — SIGNIFICANT CHANGE UP (ref 0.2–1)
BILIRUB SERPL-MCNC: 0.6 MG/DL — SIGNIFICANT CHANGE UP (ref 0.4–2)
BILIRUB UR-MCNC: ABNORMAL
BLD GP AB SCN SERPL QL: SIGNIFICANT CHANGE UP
BUN SERPL-MCNC: 10 MG/DL — SIGNIFICANT CHANGE UP (ref 8–20)
CALCIUM SERPL-MCNC: 9.5 MG/DL — SIGNIFICANT CHANGE UP (ref 8.6–10.2)
CHLORIDE SERPL-SCNC: 104 MMOL/L — SIGNIFICANT CHANGE UP (ref 98–107)
CO2 SERPL-SCNC: 25 MMOL/L — SIGNIFICANT CHANGE UP (ref 22–29)
COLOR SPEC: YELLOW — SIGNIFICANT CHANGE UP
CREAT SERPL-MCNC: 0.31 MG/DL — LOW (ref 0.5–1.3)
DIFF PNL FLD: NEGATIVE — SIGNIFICANT CHANGE UP
EOSINOPHIL # BLD AUTO: 0.05 K/UL — SIGNIFICANT CHANGE UP (ref 0–0.5)
EOSINOPHIL NFR BLD AUTO: 0.8 % — SIGNIFICANT CHANGE UP (ref 0–6)
EPI CELLS # UR: ABNORMAL
GLUCOSE SERPL-MCNC: 90 MG/DL — SIGNIFICANT CHANGE UP (ref 70–99)
GLUCOSE UR QL: NEGATIVE MG/DL — SIGNIFICANT CHANGE UP
HCG UR QL: NEGATIVE — SIGNIFICANT CHANGE UP
HCT VFR BLD CALC: 45 % — SIGNIFICANT CHANGE UP (ref 34.5–45)
HGB BLD-MCNC: 14.7 G/DL — SIGNIFICANT CHANGE UP (ref 11.5–15.5)
IMM GRANULOCYTES NFR BLD AUTO: 0.2 % — SIGNIFICANT CHANGE UP (ref 0–1.5)
INR BLD: 1.17 RATIO — HIGH (ref 0.88–1.16)
KETONES UR-MCNC: NEGATIVE — SIGNIFICANT CHANGE UP
LACTATE BLDV-MCNC: 0.9 MMOL/L — SIGNIFICANT CHANGE UP (ref 0.5–2)
LEUKOCYTE ESTERASE UR-ACNC: NEGATIVE — SIGNIFICANT CHANGE UP
LYMPHOCYTES # BLD AUTO: 2.09 K/UL — SIGNIFICANT CHANGE UP (ref 1–3.3)
LYMPHOCYTES # BLD AUTO: 34.7 % — SIGNIFICANT CHANGE UP (ref 13–44)
MCHC RBC-ENTMCNC: 29.8 PG — SIGNIFICANT CHANGE UP (ref 27–34)
MCHC RBC-ENTMCNC: 32.7 GM/DL — SIGNIFICANT CHANGE UP (ref 32–36)
MCV RBC AUTO: 91.3 FL — SIGNIFICANT CHANGE UP (ref 80–100)
MONOCYTES # BLD AUTO: 0.31 K/UL — SIGNIFICANT CHANGE UP (ref 0–0.9)
MONOCYTES NFR BLD AUTO: 5.1 % — SIGNIFICANT CHANGE UP (ref 2–14)
NEUTROPHILS # BLD AUTO: 3.54 K/UL — SIGNIFICANT CHANGE UP (ref 1.8–7.4)
NEUTROPHILS NFR BLD AUTO: 58.7 % — SIGNIFICANT CHANGE UP (ref 43–77)
NITRITE UR-MCNC: NEGATIVE — SIGNIFICANT CHANGE UP
PH UR: 5 — SIGNIFICANT CHANGE UP (ref 5–8)
PLATELET # BLD AUTO: 346 K/UL — SIGNIFICANT CHANGE UP (ref 150–400)
POTASSIUM SERPL-MCNC: 4.4 MMOL/L — SIGNIFICANT CHANGE UP (ref 3.5–5.3)
POTASSIUM SERPL-SCNC: 4.4 MMOL/L — SIGNIFICANT CHANGE UP (ref 3.5–5.3)
PROT SERPL-MCNC: 7.5 G/DL — SIGNIFICANT CHANGE UP (ref 6.6–8.7)
PROT UR-MCNC: 15 MG/DL
PROTHROM AB SERPL-ACNC: 13.3 SEC — HIGH (ref 10–12.9)
RBC # BLD: 4.93 M/UL — SIGNIFICANT CHANGE UP (ref 3.8–5.2)
RBC # FLD: 13.5 % — SIGNIFICANT CHANGE UP (ref 10.3–14.5)
RBC CASTS # UR COMP ASSIST: ABNORMAL /HPF (ref 0–4)
SODIUM SERPL-SCNC: 141 MMOL/L — SIGNIFICANT CHANGE UP (ref 135–145)
SP GR SPEC: 1.02 — SIGNIFICANT CHANGE UP (ref 1.01–1.02)
UROBILINOGEN FLD QL: 4 MG/DL
WBC # BLD: 6.03 K/UL — SIGNIFICANT CHANGE UP (ref 3.8–10.5)
WBC # FLD AUTO: 6.03 K/UL — SIGNIFICANT CHANGE UP (ref 3.8–10.5)
WBC UR QL: SIGNIFICANT CHANGE UP

## 2020-02-26 PROCEDURE — 74177 CT ABD & PELVIS W/CONTRAST: CPT

## 2020-02-26 PROCEDURE — 96374 THER/PROPH/DIAG INJ IV PUSH: CPT | Mod: XU

## 2020-02-26 PROCEDURE — 85610 PROTHROMBIN TIME: CPT

## 2020-02-26 PROCEDURE — 81025 URINE PREGNANCY TEST: CPT

## 2020-02-26 PROCEDURE — 85730 THROMBOPLASTIN TIME PARTIAL: CPT

## 2020-02-26 PROCEDURE — 99283 EMERGENCY DEPT VISIT LOW MDM: CPT | Mod: 24

## 2020-02-26 PROCEDURE — 86850 RBC ANTIBODY SCREEN: CPT

## 2020-02-26 PROCEDURE — 74177 CT ABD & PELVIS W/CONTRAST: CPT | Mod: 26

## 2020-02-26 PROCEDURE — 80076 HEPATIC FUNCTION PANEL: CPT

## 2020-02-26 PROCEDURE — 80048 BASIC METABOLIC PNL TOTAL CA: CPT

## 2020-02-26 PROCEDURE — 83605 ASSAY OF LACTIC ACID: CPT

## 2020-02-26 PROCEDURE — 99285 EMERGENCY DEPT VISIT HI MDM: CPT | Mod: 25

## 2020-02-26 PROCEDURE — 99284 EMERGENCY DEPT VISIT MOD MDM: CPT | Mod: 25

## 2020-02-26 PROCEDURE — 85027 COMPLETE CBC AUTOMATED: CPT

## 2020-02-26 PROCEDURE — 96375 TX/PRO/DX INJ NEW DRUG ADDON: CPT

## 2020-02-26 PROCEDURE — 86900 BLOOD TYPING SEROLOGIC ABO: CPT

## 2020-02-26 PROCEDURE — 81001 URINALYSIS AUTO W/SCOPE: CPT

## 2020-02-26 PROCEDURE — 86901 BLOOD TYPING SEROLOGIC RH(D): CPT

## 2020-02-26 PROCEDURE — 36415 COLL VENOUS BLD VENIPUNCTURE: CPT

## 2020-02-26 RX ORDER — ONDANSETRON 8 MG/1
4 TABLET, FILM COATED ORAL ONCE
Refills: 0 | Status: COMPLETED | OUTPATIENT
Start: 2020-02-26 | End: 2020-02-26

## 2020-02-26 RX ORDER — MORPHINE SULFATE 50 MG/1
2 CAPSULE, EXTENDED RELEASE ORAL ONCE
Refills: 0 | Status: DISCONTINUED | OUTPATIENT
Start: 2020-02-26 | End: 2020-02-26

## 2020-02-26 RX ADMIN — MORPHINE SULFATE 2 MILLIGRAM(S): 50 CAPSULE, EXTENDED RELEASE ORAL at 15:10

## 2020-02-26 RX ADMIN — ONDANSETRON 4 MILLIGRAM(S): 8 TABLET, FILM COATED ORAL at 15:10

## 2020-02-26 NOTE — ED STATDOCS - OBJECTIVE STATEMENT
45 y/o F pt with significant PMHx of kidney infection, gastric bypass surgery, an Ex-lap, and s/p bowel obstruction presents to the ED c/o left sided abdominal pain. Pt had surgery 2 weeks ago and yesterday she went to the bathroom and her BM was white/grey. Pt was sent in by Dr. Womack for evaluation. She states that the pain has been worsening the last 3 days. Denies fever, chills, N/V/D.

## 2020-02-26 NOTE — ED STATDOCS - ATTENDING CONTRIBUTION TO CARE
I, Marianela Blanco, performed the initial face to face bedside interview with this patient regarding history of present illness, review of symptoms and relevant past medical, social and family history.  I completed an independent physical examination.  I was the initial provider who evaluated this patient. I have signed out the follow up of any pending tests (i.e. labs, radiological studies) to the ACP.  I have communicated the patient’s plan of care and disposition with the ACP.  The history, relevant review of systems, past medical and surgical history, medical decision making, and physical examination was documented by the scribe in my presence and I attest to the accuracy of the documentation.

## 2020-02-26 NOTE — CONSULT NOTE ADULT - ATTENDING COMMENTS
No evidence of biliary obstruction on lab work or imaging  No CT evidence of pathology to explain left-sided pain, which is located at incision site  No emergent surgical intervention indicated  Recommend tylenol prn for incisional pain  Pt to follow up in office on 3/2/2020

## 2020-02-26 NOTE — ED STATDOCS - SKIN, MLM
skin normal color for race, warm, dry and intact. Surgical scars clean, dry, no redness, pus or swelling

## 2020-02-26 NOTE — CONSULT NOTE ADULT - SUBJECTIVE AND OBJECTIVE BOX
HPI:   43 yo F s/p Ex-Lap, IKE on 20 who presents today to the ED with complaints of having luba colored stools on . She also complains of some abdominal pain. Onset: 2 days ago. Localized to LLQ. Achy in quality, staying around the same level, intermittent. Denies nausea/vomiting. Tolerating regular diet without difficulty. Last BM was today - normal in color and consistency, no blood. BM yesterday was luba colored but has now resolved. Passing flatus. Voiding at baseline. Denies fevers/chills.     Pmhx: Denies  Pshx: Gastric bypass,  x 3, Ex-Lap, IKE  Meds: Denies  Allergies: Vicodin  Shx: Denies x 3    MEDICATIONS  (STANDING):    MEDICATIONS  (PRN):      Vital Signs Last 24 Hrs  T(C): 36.6 (2020 13:56), Max: 36.6 (2020 13:56)  T(F): 97.8 (2020 13:56), Max: 97.8 (2020 13:56)  HR: 82 (2020 13:56) (82 - 82)  BP: 102/66 (2020 13:56) (102/66 - 102/66)  BP(mean): --  RR: 20 (2020 13:56) (20 - 20)  SpO2: 97% (2020 13:56) (97% - 97%)    PE  Gen: AOX3, NAD  Pulm: Non labored breathing  CV: RRR, s1 and s2  Abd: Soft, mild distension, mild tenderness to palpation surrounding left sided laparoscopic port site, no rebound or guarding, incisions healing well with no signs of infection  Ext: Moving all 4 extremites  Vasc: + 2 DP/PT pulses bilaterally  Neuro: Good affect      I&O's Detail      LABS:                RADIOLOGY & ADDITIONAL STUDIES:

## 2020-02-26 NOTE — ED STATDOCS - NSFOLLOWUPINSTRUCTIONS_ED_ALL_ED_FT
1. TAKE ALL MEDICATIONS AS DIRECTED.    2. FOLLOW UP WITH __Surgery ________ AS DIRECTED.  YOU WERE GIVEN COPIES OF ALL LABS AND IMAGING RESULTS FROM YOUR ER VISIT--PLEASE TAKE THEM WITH YOU TO YOUR APPOINTMENT.  3. IF NEEDED, CALL [phone] TO FIND A PRIMARY CARE PHYSICIAN.  OR CALL [phone] TO MAKE AN APPOINTMENT WITH THE MEDICAL CLINIC.  4. RETURN TO THE ER FOR ANY WORSENING SYMPTOMS.      Please return to the emergency department immediately should you feel worse in any way or have any of the following symptoms:    •	especially increased or different pain  •	 fevers  •	persistent vomiting  •	shaking chills    Please return to the emergency department for a recheck in 12 hours so we can re-evaluate you and ensure that you are not developing a problem that would require surgery or hospitalization.      Please follow up with the Doctor listed within the time frame specified. Thank you for coming to the emergency department. We hope you are feeling improved and continue to get better. Have a nice day.

## 2020-02-26 NOTE — ED STATDOCS - PATIENT PORTAL LINK FT
You can access the FollowMyHealth Patient Portal offered by [facility] by registering at the following website: [email or web address]. By joining [facility]’s FollowMyHealth portal, you will also be able to view your health information using other applications (apps) compatible with our system.

## 2020-02-26 NOTE — ED PROCEDURE NOTE - PROCEDURE ADDITIONAL DETAILS
US guided IV placement performed after multiple attempts by Nursing staff that failed to obtain access due to difficulty.   Number of Attempts made: 2  Teofilo placed: 20   Clean technique used.   EBL: minimal.   NO complications.

## 2020-02-26 NOTE — ED ADULT TRIAGE NOTE - CHIEF COMPLAINT QUOTE
Left abdominal pain since Monday, patient recently had bowel surgery, denies chills/fever, no N/V, resp even/unlabored.

## 2020-02-26 NOTE — CONSULT NOTE ADULT - ASSESSMENT
43 yo F w/ recent Hx of Ex-lap, IKE for SBO, coming in today for complaints of luba colored stools    Labs pending  CT with IV and oral contrast pending  Further recommendations to follow once studies are complete 45 yo F w/ recent Hx of Ex-lap, IKE for SBO presenting with post-operative pain    Labs revealed mild transaminitis  CT with IV and oral contrast was reviewed with radiologist, it revealed no obstruction, no signs of ulceration in the GJ anastomosis  Ok to be discharged home and is to follow up with Dr. Li in 1 week

## 2020-02-26 NOTE — ED STATDOCS - CLINICAL SUMMARY MEDICAL DECISION MAKING FREE TEXT BOX
Pt presents s/p SBO with left sided abdominal pain increasing in severity x 3 days. Surgery aware. pt to get IV fluids, medicate for pain and surgical consult.

## 2020-03-01 ENCOUNTER — OUTPATIENT (OUTPATIENT)
Dept: OUTPATIENT SERVICES | Facility: HOSPITAL | Age: 45
LOS: 1 days | End: 2020-03-01
Payer: MEDICAID

## 2020-03-01 DIAGNOSIS — Z98.890 OTHER SPECIFIED POSTPROCEDURAL STATES: Chronic | ICD-10-CM

## 2020-03-01 PROCEDURE — G9001: CPT

## 2020-03-02 ENCOUNTER — APPOINTMENT (OUTPATIENT)
Dept: SURGERY | Facility: CLINIC | Age: 45
End: 2020-03-02
Payer: MEDICAID

## 2020-03-02 VITALS
HEIGHT: 58 IN | WEIGHT: 123 LBS | SYSTOLIC BLOOD PRESSURE: 100 MMHG | BODY MASS INDEX: 25.82 KG/M2 | OXYGEN SATURATION: 98 % | DIASTOLIC BLOOD PRESSURE: 68 MMHG | HEART RATE: 68 BPM | TEMPERATURE: 97.3 F

## 2020-03-02 PROCEDURE — 99024 POSTOP FOLLOW-UP VISIT: CPT

## 2020-03-02 NOTE — PHYSICAL EXAM
[JVD] : no jugular venous distention  [No Rash or Lesion] : No rash or lesion [Alert] : alert [Oriented to Person] : oriented to person [Oriented to Place] : oriented to place [Oriented to Time] : oriented to time [Calm] : calm [de-identified] : No acute distress [de-identified] : No respiratory distress [de-identified] : Regular rate [de-identified] : soft, nontender. no rebound or guarding. incisions c/d/i [de-identified] : normal range of motion

## 2020-03-02 NOTE — ASSESSMENT
[FreeTextEntry1] : Ms. VICENTE is a 44 year old woman who presented with small bowel obstruction s/p diagnostic laparoscopy, lysis of adhesions, and repair on enterotomy x 2 on 2/11/2020, who is doing well\par \par Last week, she had pain and complained of "white stools" whoever workup in ED was unremarkable and her symptoms have resolved

## 2020-03-02 NOTE — HISTORY OF PRESENT ILLNESS
[de-identified] : Ms. VICENTE is a 44 year old woman who presented with small bowel obstruction s/p diagnostic laparoscopy, lysis of adhesions, and repair on enterotomy x 2 on 2/11/2020, seen on 2/20/20 for postop appointment and was doing well, who then complained of abdominal pain on 2/25/20 and "white stools" on 2/26/20 for which patient was sent to ED, who presents today for followup. ED workup including labs and CT on 2/26/20 unremarkable. No pathology in left mid abdomen to correlate with location of pain; no increased LFTs/bilis or imaging evidence of biliary obstruction. \par \par Today, pt denies pain. Reports normal bowel movements. Denies fever/chills. No redness or pain at incision sites. Tolerating diet.

## 2020-04-02 PROBLEM — K56.600 PARTIAL SMALL BOWEL OBSTRUCTION: Status: RESOLVED | Noted: 2020-02-19 | Resolved: 2020-04-02

## 2020-04-08 DIAGNOSIS — Z71.89 OTHER SPECIFIED COUNSELING: ICD-10-CM

## 2020-09-30 ENCOUNTER — APPOINTMENT (OUTPATIENT)
Dept: OTOLARYNGOLOGY | Facility: CLINIC | Age: 45
End: 2020-09-30

## 2021-01-01 NOTE — PATIENT PROFILE ADULT - CENTRAL VENOUS CATHETER/PICC LINE
31M, single, living with girlfriend, no children, part time student, with dx of Schizoaffective bipolar type, no suicide attempts, multiple prior hospitalizations, currently in outpatient tx has an ACT team and is court mandated to take monthly invega, now BIBS after he got into a fight with his girlfriend and then became anxious with some thoughts of driving recklessly not caring if he got hurt, so he walked to the ED.     Patient states that at baseline he sleeps well, enjoys spending time with his gf, doing research, walking the dog, isnt particularly depressed, doesn't have psychotic symptoms, is adherent to his medications. He states that in the last couple of days he started getting into verbal altercations with his gf (denies any physical altercations), to the point where he is uncertain about the future of their relationship. He did not state what they argued about. Today he got into an argument, and then afterwards felt anxious with some thoughts of driving recklessly and not caring what happens, so he came to the ED. By the time of this interview, patient states that he had calmed down, is no longer having any thoughts of suicide or engaging in reckless behavior. Denies auditory, visual, or tactile hallucinations, denies delusions, reports ongoing adherence to medications. Denies decreased need for sleep, denies changes in sleep or appetite. Denies alcohol or other drug use. He states that he does not think he needs to come into a hospital and would like to be discharged to his parents residence.     Collateral from mother Ira - Patient has been staying with his gf for about two months so she has not seen him, but she has had no concerns when talking to him recently and his gf recently told him that he was doing great without safety concerns. Mother states that patient had significant behavioral issues when he was off of medication but that he has been good since, believes him if he states that he feels safe, and states that she has no problem with coming to pick him up to bring him home.
no

## 2021-02-18 NOTE — PRE-OP CHECKLIST - ALLERGY BAND ON
done
Additional Notes: The pelvic exam has been discussed with the patient and the patient's consent is documented.
Detail Level: Simple
Additional Notes: Patient consent was obtained to proceed with the visit and recommended plan of care after discussion of all risks and benefits, including the risks of COVID-19 exposure.

## 2021-09-18 NOTE — PATIENT PROFILE ADULT - NSPROMEDSPUMP_GEN_A_NUR
Pt is requesting that she receives 39 units of Lantus tonight  Explained that she was hypoglycemic this morning after receiving 39 units and therefore it was decreased to 25 units  Continues to state that she would like her 39units and she was hypoglycemic because "she didn't eat a proper dinner last night", although her bedtime glucose tonight is the same as yesterday  Discussed with patient and agreed on 32 units tonight with careful monitoring of glucose  none

## 2022-08-17 NOTE — ED STATDOCS - CHPI ED CONTEXT
Chronic and stable.  Last thyroid panel within normal limits from May 2022.  Patient denies hyper or hyper thyroid symptoms, no palpitations, no anxiety, insomnia GI issues of weight issue.  She did however noted brittle nails.  Her dose was adjusted down to current levothyroxine 88 mcg.  She reports compliance.  Did not have a chance to get her labs done yet, planning tomorrow.  
Unknown

## 2022-08-30 NOTE — ED ADULT TRIAGE NOTE - SOURCE OF INFORMATION
Patient Albendazole Pregnancy And Lactation Text: This medication is Pregnancy Category C and it isn't known if it is safe during pregnancy. It is also excreted in breast milk.

## 2023-01-11 NOTE — PRE-OP CHECKLIST - STERILIZATION AFFIRMATION
n/a Itraconazole Pregnancy And Lactation Text: This medication is Pregnancy Category C and it isn't know if it is safe during pregnancy. It is also excreted in breast milk.

## 2023-04-04 NOTE — ED ADULT NURSE NOTE - BREATHING, MLM
April 5, 2023     Bruce Chairez DPM  [address] [facility] [address]    Patient: Trista Neri   YOB: 1978   Date of Visit: 4/4/2023       Dear Dr Varun Cannon:    Thank you for referring Trista Neri to me for evaluation  Below are my notes for this consultation  If you have questions, please do not hesitate to call me  I look forward to following your patient along with you  Sincerely,        Shahla Ortega DO        CC: No Recipients  Shahla Ortega DO  4/4/2023  9:36 AM  Signed  ORTHOPEDIC ONCOLOGY NEW PATIENT NOTE    Patient: Trista Neri   Age: 38 y o     Sex: female  Gender: female  Date of visit: 4/4/2023   Referring provider: Elen Polk DPM  Patient Care Team:  Dana Adams MD as PCP - General (Family Medicine)  [facility]   Fax# [phone]    ASSESSMENT/PLAN: Trista Neri is a 38 y o  female with:    1   Palpable lesions of left trapezius, left heel, bilateral lower back  - discussed consistency with lymph chains and locations of lymph nodes   - discussed limited evidence for decrums disease, vs lipomatosis, neither supported due to lesions not feeling fatty in nature; discussed fatty masses typically non painful   - discussed uncertainty in related nature of lumps, reassured that pain may feel similar  - discussed history of lymphatic pain, with removal of thyroid and groin lymphademopathy; which may be due to increased sensitivity   - discussed principles of surgery- removing things due to malignancy, potential to become malignant, etc  - in this case, unknown if there would be benefit to removing them- trading a bump for a scar, with the potential for painful scar healing   - discussed hypersensitivity and psychosomatic features of increased sensitivity to pain  - expressed desire to benefit patient and reduce pain, with shared medical decision in mind to keep surgery as the last option after conservative measures and potential causes addressed to best   - without significant findings on multiple imaging studies, unable to determine possible physiological cause to best determine course of action  - discussed OTC pain relief, with aleve regimen, topical creams such as voltaren, continuing to use heat as beneficial   - continue following with pain management and other providers PRN to trend progression in pain relief    2  Thyroid nodules  - continue current management     3  Lesion of pineal gland   - continue current management     HISTORY OF PRESENT ILLNESS:     Anette Courtney is a 38 y o  female with history of endocrine disorders as listed in [address] who presents for consultation at the request of Elma Lees DPM regarding multiple painful lipomas throughout the body, with a possible family history of Dercum's disease  She is being worked up by Dr Malorie Villafuerte for left heel pain and left heel lipoma noted on [facility] US, but not seen on MRI    Pain in shoulder and heel most concerning, not able to workout anymore; unable to walk on heel, affecting gait     Has seen rheumatology, podiatry, general surgeon, pain specialist, multiple workups  Has tried heating pads; tramadol was helpful in the past, most recently Norco that helped  Has tried PT for foot pain prior  Describes bursting of posterior/low back lesions  Has tried injections cortisone to both shoulder and heel    History of one removed in groin in 2004, clipped when having daughter/appendix removed  Denies cafe au lait spots, axillary freckling  At baseline patient gaits without assistance  No noted constitutional symptoms such as fever, chills, night sweats, fatigue, weight gains/losses  Denies chest pain/shortness of breath    History of skin cancer removed from back    Occupation: 2 jobs, able to sit during    Past Medical History:   Diagnosis Date   • Anemia    • Disease of thyroid gland     hyperthyroid   • Foot pain, left 2013    She fractured a bone in her left foot  Had severe pain associated with redness  Was given the diagnosis of reflex sympathetic dystrophy  Has had nerve blocks which were ineffective  • Galactorrhea in female 11/10/2015   • Genital warts    • Graves' disease 7/3/2018    TSI positive  Intolerant of TPU  Switched to methimazole 5 mg daily by endocrinologist   TSH in June of 2018 0 005 with normal T3 & T4  Followed by endocrinology at [facility] Dr Michael Macario  • Hepatitis B 1998    Diagnosed with acute Hepatitis B  Subsequent Hep B Surface Ag negative, Surface Ab positive  HCV negative, HIV negative 2010  • History of breast lump    • Hypertrophic scar 07/12/2012   • Insomnia    • Iron deficiency anemia due to chronic blood loss 7/3/2018     Hemoglobin 11 4 in June of 2018 with a ferritin of 10  Presumed secondary to menstrual losses  • Kidney stone     Has passed several kidney stones  No prior kidney stone workup  Try to stay well hydrated  No kidney stones for several years     • Menorrhagia    • Uterine fibroid    • 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   • Wears glasses      Past Surgical History:   Procedure Laterality Date   • APPENDECTOMY  2001   • BREAST BIOPSY Left     benign- ultrasound biopsy- 2019   • BREAST IMPLANT Bilateral 2008   • CHOLECYSTECTOMY  2004   • ENDOMETRIAL ABLATION N/A 05/18/2021    Procedure: Nieves Hernandez;  Surgeon: Jodee Dougherty MD;  Location: [facility];  Service: Gynecology   • FOOT SURGERY  2013   • NH HYSTEROSCOPY BX ENDOMETRIUM&/POLYPC W/WO D&C N/A 05/18/2021    Procedure: D&C W/ HYSTEROSCOPY;  Surgeon: Jodee Dougherty MD;  Location: [facility];  Service: Gynecology   • REVISION OF SCAR  02/18/2012    appendectomy scar revised   • US GUIDED BREAST BIOPSY LEFT COMPLETE Left 04/2019       Current Outpatient Medications:   •  DULoxetine (CYMBALTA) 30 mg delayed release capsule, Take 1 capsule (30 mg total) by mouth daily, Disp: 30 capsule, Rfl: 0  •  Ferrous Sulfate (IRON PO), Take 64 mg by mouth 2 (two) times a week, Disp: , Rfl:   •  Flowflex COVID-19 Ag Home Test KIT, TESTING, Disp: , Rfl:   •  HYDROcodone-acetaminophen (NORCO)  mg per tablet, , Disp: , Rfl:   •  methimazole (TAPAZOLE) 5 mg tablet, Take 1 tablet (5 mg total) by mouth in the morning, Disp: 90 tablet, Rfl: 3  •  multivitamin (THERAGRAN) TABS, Take 1 tablet by mouth daily, Disp: , Rfl:   •  neomycin-polymyxin-hydrocortisone (CORTISPORIN) 0 35%-10,000 units/mL-1% otic suspension, Administer 4 drops into the left ear 4 (four) times a day for 7 days, Disp: 10 mL, Rfl: 0  •  zolpidem (AMBIEN) 10 mg tablet, TAKE 1 TABLET BY MOUTH DAILY AT BEDTIME AS NEEDED FOR SLEEP, Disp: 30 tablet, Rfl: 0  •  predniSONE 10 mg tablet, Take once daily all days pills on this schedule 6- 6- 5- 4- 3- 2- 1 (Patient not taking: Reported on 12/22/2022), Disp: 27 tablet, Rfl: 0  Allergies   Allergen Reactions   • Sulfa Antibiotics Other (See Comments)     Worsens UTI symptoms   • Aspirin Rash and GI Intolerance   • Penicillins Rash and GI Intolerance     Family History   Problem Relation Age of Onset   • Breast cancer Mother 61   • Thyroid disease Mother    • Iron deficiency Mother    • Heart disease Mother    • Thyroid disease Sister    • Thyroid disease Maternal Grandmother    • Diabetes Maternal Grandmother    • Heart disease Maternal Grandfather    • Prostate cancer Maternal Grandfather    • Pancreatic cancer Maternal Grandfather    • Cervical cancer Paternal Grandmother    • Heart disease Paternal Grandmother    • Endometrial cancer Paternal Grandmother    • Heart disease Paternal Grandfather    • Hypertension Paternal Grandfather    • Cancer Paternal Grandfather    • Carpal tunnel syndrome Father    • Heart disease Maternal Aunt    • Thyroid disease Maternal Aunt    • No Known Problems Daughter    • No Known Problems Daughter    • Thyroid disease Maternal Aunt    • No Known Problems Paternal Aunt    • No Known Problems Paternal Aunt    • No Known Problems Daughter      Social History     Socioeconomic History   • Marital status: /Civil Union     Spouse name: Not on file   • Number of children: Not on file   • Years of education: Not on file   • Highest education level: Not on file   Occupational History   • Not on file   Tobacco Use   • Smoking status: Never   • Smokeless tobacco: Never   Vaping Use   • Vaping Use: Never used   Substance and Sexual Activity   • Alcohol use: Not Currently     Comment: rare, few times a year   • Drug use: No     Comment: last used heroin/cocaine age 17- IV usage,describes self as recovering addict   • Sexual activity: Yes     Partners: Male     Birth control/protection: Male Sterilization   Other Topics Concern   • Not on file   Social History Narrative    Works as a  for [employer]  Social Determinants of Health     Financial Resource Strain: Not on file   Food Insecurity: Not on file   Transportation Needs: Not on file   Physical Activity: Not on file   Stress: Not on file   Social Connections: Not on file   Intimate Partner Violence: Not on file   Housing Stability: Not on file       REVIEW OF SYSTEMS  Allergies, medications, past medical/surgical/family/social history have been reviewed  Complete 12 system review performed and found to be negative except: except as per mentioned in HPI  Physical Exam     Height: 6' (182 9 cm)  Weight - Scale: 75 3 kg (166 lb)  BMI (Calculated): 22 5  BSA (Calculated - m2): 1 97 sq meters     Vitals:    04/04/23 0754   BP: 156/79   Pulse: 89     Body mass index is 22 51 kg/m²  General: alert and oriented; well nourished/well developed; no apparent distress  Psychiatric: normal mood and affect  HEENT: NCAT  Head/neck - full range of motion  Lungs: breathing comfortably; equal symmetric chest expansion  Abdomen: soft, non-tender, non-distended     Skin: warm; dry; no lesions, rashes, petechiae or purpura; no clubbing, no cyanosis, no edema  Extremities:    Inspection: no edema, skin abnormalities throughout   Palpation: multiple palpable masses or lesions   Motor strength: WNL all extremities  Sensation: grossly intact to all extremities  Pulses: present   Lymphatics: painful inguinal and posterior cervical lymph chains   Gait: normal gait  ROM limited to neck rotation to the left due to trapezius pain  Posterior lymph chain consistent with location of pain in neck/shoulder    Bruising to left heel, present since October  Rigid, mobile small ovoid, orzo sized masses      IMAGING RESULTS, All images personally review today by Dr David Morgan  Study: MRI without  Area: foot/ankle  Date: 7/6/22  Impression: Small heel spur underlying the region of interest without evidence of acute plantar fasciitis  Mild posterior tibialis tenosynovitis  Mild common peroneal tenosynovitis  No true lipoma seen    Study: US  Area: Foot   Date: 3/21/23  Impression: FINDINGS: The plantar fascia is normal in thickness and echotexture, measuring 3 mm on the left and 3 mm on the right  There is increased fatty tissue in the heel pad bilaterally  On the left that has a more masslike appearance measuring approximately 1 7 x 0 5 x 1 6 cm, which is seen compressing and shifting during palpation, seen best on cine loop 20 and 21  The appearance of the heel pad is similar on the right although a discrete nodule is not seen  Study: US  Area: superficial lump left shoulder  Date: 2/17/23  FINDINGS:  No abnormality identified throughout the imaged region of concern  Impression: No ultrasonographic abnormality is noted to correlate with the area of palpable concern  Pertinent laboratory findings:  None    Pathology  None available    Microbiology:  Cultures: None    Review of referring provider's records:  Alec Chu DPM  Date: 3/24/23  Impression:   · Left heel US MSK reviewed, possible lipoma noted   Patient noted painful lipomas at other areas of body and has family hx of Liss's dz    • I reviewed spine and pain for possibly recs - currently on cymbalta w/o relief  F/u for further med mnmgt of pain   · Dr Carlitos Luna with ortho onc referral placed for further workup due to multiple lipomas throughout the body    Dr Yared Chamberlain, General Surgery with [facility]  2/16/23  Due to the location of her symptoms in the deep calcaneal region of the left foot, I feel this is an issue that should be managed by podiatry  The patient was unhappy with her previous podiatry encounters/results and therefore I offered her a referral to a different podiatrist locally for a second opinion  As for the left shoulder pain - I do not feel a discrete mass confidently on exam and therefore will order an ultrasound of the area to better identify a potential mass  Follow up with results of ultrasound  FOLLOW UP: Return for imaging results       45 minutes was spent in the coordination of care, reviewing of imaging and with the patient on the date of service    Scribe Attestation    I,:  David Tuttle PA-C am acting as a scribe while in the presence of the attending physician :       I,:  Asmita France DO personally performed the services described in this documentation    as scribed in my presence :            David Tuttle PA-C   4/4/2023 8:29 AM Spontaneous, unlabored and symmetrical

## 2023-04-21 NOTE — PRE-OP CHECKLIST - SURGICAL CONSENT
Pt returned call.    Pt confirmed new appt time for SE and OV.    Pt can be reached at [phone]   done

## 2023-12-13 NOTE — ED ADULT TRIAGE NOTE - BMI (KG/M2)
Stretch your chest by raising arms and placing hands behind back intermittently throughout the day. You may ice the area for 20min with a cloth between the skin and ice to avoid damaging the tissue. 25.7

## 2025-06-04 NOTE — PATIENT PROFILE ADULT - PRO INTERPRETER NEED 2
[facility]    PROCEDURE:  1. Bilateral breast amputations                            2. Bilateral free nipple grafting.     3. Application of Mary incisional wound vacuum devices  DATE: 5/1/25    Ambar Martinez has been recovering well since her procedure. Pain has been well controlled without pain medications.     EXAM    /83   Pulse 89   Ht 1.626 m (5' 4\")   Wt 79.8 kg (176 lb)   LMP 03/13/2019 (Approximate)   BMI 30.21 kg/m²      GEN: NAD   BREAST: Incisions healing well  Good contour  Nipple grafts viable    IMP: 63 y.o.female s/p breast amputation with free nipple grafting  PLAN: Doing well overall. She is thus far happy with her results as her symptoms of macromastia have resolved. Will continue with local care and follow-up in 1 month.     Geovany Berry MD  [facility]  [phone]  06/04/25   English